# Patient Record
Sex: FEMALE | Race: WHITE | NOT HISPANIC OR LATINO | Employment: FULL TIME | URBAN - METROPOLITAN AREA
[De-identification: names, ages, dates, MRNs, and addresses within clinical notes are randomized per-mention and may not be internally consistent; named-entity substitution may affect disease eponyms.]

---

## 2023-02-25 LAB
ALANINE AMINOTRANSFERASE (SGPT) (U/L) IN SER/PLAS: 16 U/L (ref 7–45)
ALBUMIN (G/DL) IN SER/PLAS: 4.4 G/DL (ref 3.4–5)
ALKALINE PHOSPHATASE (U/L) IN SER/PLAS: 37 U/L (ref 33–110)
ANION GAP IN SER/PLAS: 12 MMOL/L (ref 10–20)
ASPARTATE AMINOTRANSFERASE (SGOT) (U/L) IN SER/PLAS: 19 U/L (ref 9–39)
BILIRUBIN TOTAL (MG/DL) IN SER/PLAS: 0.7 MG/DL (ref 0–1.2)
CALCIUM (MG/DL) IN SER/PLAS: 10.4 MG/DL (ref 8.6–10.3)
CARBON DIOXIDE, TOTAL (MMOL/L) IN SER/PLAS: 28 MMOL/L (ref 21–32)
CHLORIDE (MMOL/L) IN SER/PLAS: 105 MMOL/L (ref 98–107)
CHOLESTEROL (MG/DL) IN SER/PLAS: 256 MG/DL (ref 0–199)
CHOLESTEROL IN HDL (MG/DL) IN SER/PLAS: 60.9 MG/DL
CHOLESTEROL/HDL RATIO: 4.2
CREATININE (MG/DL) IN SER/PLAS: 0.88 MG/DL (ref 0.5–1.05)
ERYTHROCYTE DISTRIBUTION WIDTH (RATIO) BY AUTOMATED COUNT: 12.4 % (ref 11.5–14.5)
ERYTHROCYTE MEAN CORPUSCULAR HEMOGLOBIN CONCENTRATION (G/DL) BY AUTOMATED: 31.9 G/DL (ref 32–36)
ERYTHROCYTE MEAN CORPUSCULAR VOLUME (FL) BY AUTOMATED COUNT: 92 FL (ref 80–100)
ERYTHROCYTES (10*6/UL) IN BLOOD BY AUTOMATED COUNT: 4.38 X10E12/L (ref 4–5.2)
GFR FEMALE: 76 ML/MIN/1.73M2
GLUCOSE (MG/DL) IN SER/PLAS: 83 MG/DL (ref 74–99)
HEMATOCRIT (%) IN BLOOD BY AUTOMATED COUNT: 40.5 % (ref 36–46)
HEMOGLOBIN (G/DL) IN BLOOD: 12.9 G/DL (ref 12–16)
LDL: 174 MG/DL (ref 0–99)
LEUKOCYTES (10*3/UL) IN BLOOD BY AUTOMATED COUNT: 6.4 X10E9/L (ref 4.4–11.3)
PLATELETS (10*3/UL) IN BLOOD AUTOMATED COUNT: 288 X10E9/L (ref 150–450)
POTASSIUM (MMOL/L) IN SER/PLAS: 5.2 MMOL/L (ref 3.5–5.3)
PROTEIN TOTAL: 6.3 G/DL (ref 6.4–8.2)
SODIUM (MMOL/L) IN SER/PLAS: 140 MMOL/L (ref 136–145)
THYROTROPIN (MIU/L) IN SER/PLAS BY DETECTION LIMIT <= 0.05 MIU/L: 1.47 MIU/L (ref 0.44–3.98)
TRIGLYCERIDE (MG/DL) IN SER/PLAS: 106 MG/DL (ref 0–149)
UREA NITROGEN (MG/DL) IN SER/PLAS: 15 MG/DL (ref 6–23)
VLDL: 21 MG/DL (ref 0–40)

## 2023-08-09 ENCOUNTER — HOSPITAL ENCOUNTER (OUTPATIENT)
Dept: DATA CONVERSION | Facility: HOSPITAL | Age: 58
Discharge: HOME | End: 2023-08-09

## 2023-08-09 DIAGNOSIS — R19.7 DIARRHEA, UNSPECIFIED: ICD-10-CM

## 2023-08-09 DIAGNOSIS — K76.9 LIVER DISEASE, UNSPECIFIED: ICD-10-CM

## 2023-08-09 DIAGNOSIS — R10.9 UNSPECIFIED ABDOMINAL PAIN: ICD-10-CM

## 2023-08-09 DIAGNOSIS — K52.9 NONINFECTIVE GASTROENTERITIS AND COLITIS, UNSPECIFIED: ICD-10-CM

## 2023-08-09 LAB
ALBUMIN SERPL-MCNC: 4.5 GM/DL (ref 3.5–5)
ALBUMIN/GLOB SERPL: 1.6 RATIO (ref 1.5–3)
ALP BLD-CCNC: 50 U/L (ref 35–125)
ALT SERPL-CCNC: 22 U/L (ref 5–40)
ANION GAP SERPL CALCULATED.3IONS-SCNC: 13 MMOL/L (ref 0–19)
AST SERPL-CCNC: 38 U/L (ref 5–40)
BASOPHILS # BLD AUTO: 0.03 K/UL (ref 0–0.22)
BASOPHILS NFR BLD AUTO: 0.2 % (ref 0–1)
BILIRUB SERPL-MCNC: 0.5 MG/DL (ref 0.1–1.2)
BILIRUB UR QL STRIP.AUTO: NEGATIVE
BUN SERPL-MCNC: 20 MG/DL (ref 8–25)
BUN/CREAT SERPL: 25 RATIO (ref 8–21)
CALCIUM SERPL-MCNC: 10.6 MG/DL (ref 8.5–10.4)
CHLORIDE SERPL-SCNC: 102 MMOL/L (ref 97–107)
CLARITY UR: CLEAR
CO2 SERPL-SCNC: 21 MMOL/L (ref 24–31)
COLOR UR: YELLOW
CREAT SERPL-MCNC: 0.8 MG/DL (ref 0.4–1.6)
DEPRECATED RDW RBC AUTO: 39.5 FL (ref 37–54)
DIFFERENTIAL METHOD BLD: ABNORMAL
EOSINOPHIL # BLD AUTO: 0 K/UL (ref 0–0.45)
EOSINOPHIL NFR BLD: 0 % (ref 0–3)
ERYTHROCYTE [DISTWIDTH] IN BLOOD BY AUTOMATED COUNT: 12.1 % (ref 11.7–15)
GFR SERPL CREATININE-BSD FRML MDRD: 85 ML/MIN/1.73 M2
GLOBULIN SER-MCNC: 2.8 G/DL (ref 1.9–3.7)
GLUCOSE SERPL-MCNC: 136 MG/DL (ref 65–99)
GLUCOSE UR STRIP.AUTO-MCNC: NEGATIVE MG/DL
HCG UR QL: NEGATIVE
HCT VFR BLD AUTO: 42.3 % (ref 36–44)
HGB BLD-MCNC: 14.3 GM/DL (ref 12–15)
HGB UR QL STRIP.AUTO: ABNORMAL /HPF (ref 0–3)
HGB UR QL: NEGATIVE
IMM GRANULOCYTES # BLD AUTO: 0.06 K/UL (ref 0–0.1)
KETONES UR QL STRIP.AUTO: NEGATIVE
LEUKOCYTE ESTERASE UR QL STRIP.AUTO: NEGATIVE
LIPASE SERPL-CCNC: 53 U/L (ref 16–63)
LYMPHOCYTES # BLD AUTO: 0.62 K/UL (ref 1.2–3.2)
LYMPHOCYTES NFR BLD MANUAL: 3.7 % (ref 20–40)
MCH RBC QN AUTO: 30.1 PG (ref 26–34)
MCHC RBC AUTO-ENTMCNC: 33.8 % (ref 31–37)
MCV RBC AUTO: 89.1 FL (ref 80–100)
MICROSCOPIC (UA): ABNORMAL
MONOCYTES # BLD AUTO: 0.74 K/UL (ref 0–0.8)
MONOCYTES NFR BLD MANUAL: 4.4 % (ref 0–8)
NEUTROPHILS # BLD AUTO: 15.31 K/UL
NEUTROPHILS # BLD AUTO: 15.31 K/UL (ref 1.8–7.7)
NEUTROPHILS.IMMATURE NFR BLD: 0.4 % (ref 0–1)
NEUTS SEG NFR BLD: 91.3 % (ref 50–70)
NITRITE UR QL STRIP.AUTO: NEGATIVE
NRBC BLD-RTO: 0 /100 WBC
PH UR STRIP.AUTO: 5.5 [PH] (ref 4.6–8)
PLATELET # BLD AUTO: 257 K/UL (ref 150–450)
PLATELET BLD QL SMEAR: ABNORMAL
PMV BLD AUTO: 9.9 CU (ref 7–12.6)
POTASSIUM SERPL-SCNC: 3.8 MMOL/L (ref 3.4–5.1)
PROT SERPL-MCNC: 7.3 G/DL (ref 5.9–7.9)
PROT UR STRIP.AUTO-MCNC: ABNORMAL MG/DL
RBC # BLD AUTO: 4.75 M/UL (ref 4–4.9)
RBC MORPH BLD: ABNORMAL
SODIUM SERPL-SCNC: 136 MMOL/L (ref 133–145)
SP GR UR STRIP.AUTO: 1.03 (ref 1–1.03)
UNSPECIFIED CRY UR QL COMP ASSIST: ABNORMAL
URINE CULTURE: ABNORMAL
UROBILINOGEN UR QL STRIP.AUTO: NORMAL MG/DL (ref 0–1)
WBC # BLD AUTO: 16.8 K/UL (ref 4.5–11)
WBC #/AREA URNS AUTO: ABNORMAL /HPF (ref 0–3)
WBC MORPH BLD: ABNORMAL

## 2023-08-11 ENCOUNTER — TELEPHONE (OUTPATIENT)
Dept: PRIMARY CARE | Facility: CLINIC | Age: 58
End: 2023-08-11
Payer: COMMERCIAL

## 2023-08-11 NOTE — TELEPHONE ENCOUNTER
----- Message from Aurora Eagle sent at 8/10/2023  3:55 PM EDT -----  Patient requesting a call back from you regarding the pain you two previously spoke about. Than you.      Will discuss at upcoming visit

## 2023-08-17 ENCOUNTER — APPOINTMENT (OUTPATIENT)
Dept: PRIMARY CARE | Facility: CLINIC | Age: 58
End: 2023-08-17
Payer: COMMERCIAL

## 2023-11-20 PROBLEM — M79.605 PAIN OF LEFT LOWER EXTREMITY: Status: ACTIVE | Noted: 2023-11-20

## 2023-11-20 PROBLEM — K76.9 LIVER DISEASE: Status: ACTIVE | Noted: 2023-08-09

## 2023-11-20 PROBLEM — H83.2X1: Status: ACTIVE | Noted: 2023-11-20

## 2023-11-20 PROBLEM — R60.0 EDEMA OF LEFT LOWER EXTREMITY: Status: ACTIVE | Noted: 2023-11-20

## 2023-11-20 PROBLEM — H93.19 TINNITUS: Status: ACTIVE | Noted: 2023-11-20

## 2023-11-20 PROBLEM — M81.0 OSTEOPOROSIS: Status: ACTIVE | Noted: 2022-12-23

## 2023-11-20 PROBLEM — N95.1 MENOPAUSAL SYMPTOMS: Status: ACTIVE | Noted: 2023-11-20

## 2023-11-20 PROBLEM — R19.7 DIARRHEA: Status: ACTIVE | Noted: 2023-08-09

## 2023-11-20 PROBLEM — D21.9 MYOMA: Status: ACTIVE | Noted: 2023-11-20

## 2023-11-20 PROBLEM — Z87.891 PERSONAL HISTORY OF NICOTINE DEPENDENCE: Status: ACTIVE | Noted: 2022-08-08

## 2023-11-20 PROBLEM — E55.9 VITAMIN D DEFICIENCY: Status: ACTIVE | Noted: 2023-01-04

## 2023-11-20 PROBLEM — R59.9 ADENOPATHY: Status: ACTIVE | Noted: 2023-11-20

## 2023-11-20 PROBLEM — D64.9 ANEMIA: Status: ACTIVE | Noted: 2022-08-08

## 2023-11-20 PROBLEM — K63.5 POLYP OF COLON: Status: ACTIVE | Noted: 2022-08-08

## 2023-11-20 PROBLEM — Z85.3 HISTORY OF MALIGNANT NEOPLASM OF BREAST: Status: ACTIVE | Noted: 2023-01-04

## 2023-11-20 PROBLEM — Z86.2 HISTORY OF ANEMIA: Status: ACTIVE | Noted: 2023-11-20

## 2023-11-20 PROBLEM — R10.9 ABDOMINAL PAIN: Status: ACTIVE | Noted: 2023-08-09

## 2023-11-20 PROBLEM — C50.211 MALIGNANT NEOPLASM OF UPPER-INNER QUADRANT OF RIGHT FEMALE BREAST (MULTI): Status: ACTIVE | Noted: 2023-01-04

## 2023-11-20 PROBLEM — J31.0 CHRONIC RHINITIS: Status: ACTIVE | Noted: 2023-11-20

## 2023-11-20 PROBLEM — N63.10 BREAST MASS, RIGHT: Status: ACTIVE | Noted: 2023-11-20

## 2023-11-20 PROBLEM — R43.8 IMPAIRED SENSE OF SMELL: Status: ACTIVE | Noted: 2023-11-20

## 2023-11-20 PROBLEM — I61.9 INTRACEREBRAL HEMORRHAGE (MULTI): Status: ACTIVE | Noted: 2023-11-20

## 2023-11-20 PROBLEM — A09 INFECTIOUS COLITIS: Status: ACTIVE | Noted: 2023-11-20

## 2023-11-20 PROBLEM — E04.2 NONTOXIC MULTINODULAR GOITER: Status: ACTIVE | Noted: 2022-08-08

## 2023-11-20 PROBLEM — E83.52 HYPERCALCEMIA: Status: ACTIVE | Noted: 2023-11-20

## 2023-11-20 PROBLEM — E78.5 HYPERLIPIDEMIA: Status: ACTIVE | Noted: 2023-11-20

## 2023-11-20 RX ORDER — ALENDRONATE SODIUM 70 MG/1
70 TABLET ORAL
COMMUNITY
End: 2024-06-03 | Stop reason: SDUPTHER

## 2023-11-20 RX ORDER — IBUPROFEN 600 MG/1
600 TABLET ORAL EVERY 6 HOURS PRN
COMMUNITY
Start: 2023-08-09 | End: 2024-03-22 | Stop reason: ALTCHOICE

## 2023-11-20 RX ORDER — ANASTROZOLE 1 MG/1
1 TABLET ORAL DAILY
COMMUNITY
Start: 2022-12-28 | End: 2023-12-06 | Stop reason: SDUPTHER

## 2023-11-20 RX ORDER — CIPROFLOXACIN 500 MG/1
500 TABLET ORAL 2 TIMES DAILY
COMMUNITY
Start: 2023-08-09 | End: 2024-03-22 | Stop reason: ALTCHOICE

## 2023-12-01 ENCOUNTER — LAB (OUTPATIENT)
Dept: LAB | Facility: HOSPITAL | Age: 58
End: 2023-12-01
Payer: COMMERCIAL

## 2023-12-01 DIAGNOSIS — D64.9 ANEMIA: ICD-10-CM

## 2023-12-01 DIAGNOSIS — E55.9 VITAMIN D DEFICIENCY: Primary | ICD-10-CM

## 2023-12-01 LAB
25(OH)D3 SERPL-MCNC: 33 NG/ML (ref 30–100)
ALBUMIN SERPL BCP-MCNC: 4.7 G/DL (ref 3.4–5)
ALP SERPL-CCNC: 38 U/L (ref 33–110)
ALT SERPL W P-5'-P-CCNC: 23 U/L (ref 7–45)
ANION GAP SERPL CALC-SCNC: 12 MMOL/L (ref 10–20)
AST SERPL W P-5'-P-CCNC: 25 U/L (ref 9–39)
BASOPHILS # BLD AUTO: 0.05 X10*3/UL (ref 0–0.1)
BASOPHILS NFR BLD AUTO: 0.9 %
BILIRUB SERPL-MCNC: 0.6 MG/DL (ref 0–1.2)
BUN SERPL-MCNC: 9 MG/DL (ref 6–23)
CALCIUM SERPL-MCNC: 9.8 MG/DL (ref 8.6–10.3)
CHLORIDE SERPL-SCNC: 106 MMOL/L (ref 98–107)
CO2 SERPL-SCNC: 25 MMOL/L (ref 21–32)
CREAT SERPL-MCNC: 0.64 MG/DL (ref 0.5–1.05)
EOSINOPHIL # BLD AUTO: 0.11 X10*3/UL (ref 0–0.7)
EOSINOPHIL NFR BLD AUTO: 1.9 %
ERYTHROCYTE [DISTWIDTH] IN BLOOD BY AUTOMATED COUNT: 12.1 % (ref 11.5–14.5)
GFR SERPL CREATININE-BSD FRML MDRD: >90 ML/MIN/1.73M*2
GLUCOSE SERPL-MCNC: 87 MG/DL (ref 74–99)
HCT VFR BLD AUTO: 42 % (ref 36–46)
HGB BLD-MCNC: 13.3 G/DL (ref 12–16)
IMM GRANULOCYTES # BLD AUTO: 0.01 X10*3/UL (ref 0–0.7)
IMM GRANULOCYTES NFR BLD AUTO: 0.2 % (ref 0–0.9)
LYMPHOCYTES # BLD AUTO: 1.53 X10*3/UL (ref 1.2–4.8)
LYMPHOCYTES NFR BLD AUTO: 26.6 %
MCH RBC QN AUTO: 29.6 PG (ref 26–34)
MCHC RBC AUTO-ENTMCNC: 31.7 G/DL (ref 32–36)
MCV RBC AUTO: 94 FL (ref 80–100)
MONOCYTES # BLD AUTO: 0.66 X10*3/UL (ref 0.1–1)
MONOCYTES NFR BLD AUTO: 11.5 %
NEUTROPHILS # BLD AUTO: 3.39 X10*3/UL (ref 1.2–7.7)
NEUTROPHILS NFR BLD AUTO: 58.9 %
PLATELET # BLD AUTO: 234 X10*3/UL (ref 150–450)
POTASSIUM SERPL-SCNC: 4.5 MMOL/L (ref 3.5–5.3)
PROT SERPL-MCNC: 6.5 G/DL (ref 6.4–8.2)
RBC # BLD AUTO: 4.49 X10*6/UL (ref 4–5.2)
SODIUM SERPL-SCNC: 138 MMOL/L (ref 136–145)
WBC # BLD AUTO: 5.8 X10*3/UL (ref 4.4–11.3)

## 2023-12-01 PROCEDURE — 82306 VITAMIN D 25 HYDROXY: CPT

## 2023-12-01 PROCEDURE — 36415 COLL VENOUS BLD VENIPUNCTURE: CPT

## 2023-12-01 PROCEDURE — 85025 COMPLETE CBC W/AUTO DIFF WBC: CPT

## 2023-12-01 PROCEDURE — 80053 COMPREHEN METABOLIC PANEL: CPT

## 2023-12-06 ENCOUNTER — OFFICE VISIT (OUTPATIENT)
Dept: HEMATOLOGY/ONCOLOGY | Facility: CLINIC | Age: 58
End: 2023-12-06
Payer: COMMERCIAL

## 2023-12-06 ENCOUNTER — APPOINTMENT (OUTPATIENT)
Dept: LAB | Facility: HOSPITAL | Age: 58
End: 2023-12-06
Payer: COMMERCIAL

## 2023-12-06 VITALS
HEART RATE: 61 BPM | TEMPERATURE: 98.2 F | HEIGHT: 64 IN | SYSTOLIC BLOOD PRESSURE: 117 MMHG | BODY MASS INDEX: 24.24 KG/M2 | DIASTOLIC BLOOD PRESSURE: 77 MMHG | RESPIRATION RATE: 18 BRPM | WEIGHT: 141.98 LBS | OXYGEN SATURATION: 95 %

## 2023-12-06 DIAGNOSIS — Z17.0 MALIGNANT NEOPLASM OF UPPER-INNER QUADRANT OF RIGHT BREAST IN FEMALE, ESTROGEN RECEPTOR POSITIVE (MULTI): Primary | ICD-10-CM

## 2023-12-06 DIAGNOSIS — C50.211 MALIGNANT NEOPLASM OF UPPER-INNER QUADRANT OF RIGHT BREAST IN FEMALE, ESTROGEN RECEPTOR POSITIVE (MULTI): Primary | ICD-10-CM

## 2023-12-06 PROCEDURE — 99214 OFFICE O/P EST MOD 30 MIN: CPT | Performed by: NURSE PRACTITIONER

## 2023-12-06 RX ORDER — ANASTROZOLE 1 MG/1
1 TABLET ORAL DAILY
Qty: 90 TABLET | Refills: 3 | Status: SHIPPED | OUTPATIENT
Start: 2023-12-06 | End: 2024-06-10 | Stop reason: SDUPTHER

## 2023-12-06 ASSESSMENT — COLUMBIA-SUICIDE SEVERITY RATING SCALE - C-SSRS
2. HAVE YOU ACTUALLY HAD ANY THOUGHTS OF KILLING YOURSELF?: NO
6. HAVE YOU EVER DONE ANYTHING, STARTED TO DO ANYTHING, OR PREPARED TO DO ANYTHING TO END YOUR LIFE?: NO
1. IN THE PAST MONTH, HAVE YOU WISHED YOU WERE DEAD OR WISHED YOU COULD GO TO SLEEP AND NOT WAKE UP?: NO

## 2023-12-06 ASSESSMENT — ENCOUNTER SYMPTOMS
DEPRESSION: 0
OCCASIONAL FEELINGS OF UNSTEADINESS: 0
LOSS OF SENSATION IN FEET: 0

## 2023-12-06 ASSESSMENT — PAIN SCALES - GENERAL: PAINLEVEL: 0-NO PAIN

## 2023-12-06 ASSESSMENT — PATIENT HEALTH QUESTIONNAIRE - PHQ9
2. FEELING DOWN, DEPRESSED OR HOPELESS: NOT AT ALL
1. LITTLE INTEREST OR PLEASURE IN DOING THINGS: NOT AT ALL
SUM OF ALL RESPONSES TO PHQ9 QUESTIONS 1 AND 2: 0

## 2023-12-06 NOTE — PROGRESS NOTES
"Patient ID: Paulette Mercer is a 58 y.o. female.    Primary Care Provider: Maricel Wagner, LINDA-CNP  Visit Type: follow up      Subjective    HPI    Cancer History:    Breast   AJCC Edition: 7th (AJCC), Diagnosis Date: 02-Dec-2015, IIA, T2 N0 M0    Breast   AJCC Edition: 7th (AJCC), Diagnosis Date: 04-May-2016, IIA, T1b pN1a M0       Chief Complaint: follow up   Interval History:    58-year-old  female presented Dec 2015 with right breast invasive ductal carcinoma, 2.5 cm, ER/[place] positive, HER-2 positive, as well as DCIS,   neoadjuvant chemotherapy with TCHP from 12/21/15 to 4/2016,   lumpectomy on 5/4/16 of 0.7cm with 1/7LN  (ypT1b ypN1a),   completed adjuvant XRT on 7/27/16.   started on tamoxifen since 9/1/16. She was switched to anastrozole since 1/1/19.  She was also continued on every 3 weeks Herceptin for total a year after surgery. She completed in 12/2016. LMP 1/2016.   DEXA scan in 12/2020 showed osteoporosis and she was started on Fosamax.  Repeat DEXA 12/23/22 still showed osteoporosis, but was improved.      Paulette presents to office today for routine evaluation and follow up.  Mammogram 1/4/23 benign.   She is otherwise tolerating it well with minimal hot flashes and no bone or joint pain.      Review of Systems - Oncology Normal    Objective   BSA: 1.7 meters squared  /77 (BP Location: Left arm, Patient Position: Sitting, BP Cuff Size: Large adult)   Pulse 61   Temp 36.8 °C (98.2 °F) (Temporal)   Resp 18   Ht (S) 1.623 m (5' 3.9\")   Wt 64.4 kg (141 lb 15.6 oz)   SpO2 95%   BMI 24.45 kg/m²      has a past medical history of Personal history of diseases of the blood and blood-forming organs and certain disorders involving the immune mechanism, Personal history of malignant neoplasm of breast, and Personal history of other diseases of the nervous system and sense organs.   has a past surgical history that includes Breast lumpectomy (10/17/2016) and Breast lumpectomy " (06/24/2016).        Paulette Mercer  has no history on file for tobacco use.  She  has no history on file for alcohol use.  She  has no history on file for drug use.    Physical Exam  Normal exam  Bilateral breast exam shows right breast smaller with nipple inversion secondary to surgery.  No palpable masses or nodularity.  No suspcious findings    WBC   Date/Time Value Ref Range Status   12/01/2023 08:28 AM 5.8 4.4 - 11.3 x10*3/uL Final   08/09/2023 09:32 AM 16.8 (H) 4.5 - 11.0 K/UL Final   06/02/2023 08:15 AM 5.9 4.4 - 11.3 x10E9/L Final   02/25/2023 09:28 AM 6.4 4.4 - 11.3 x10E9/L Final     nRBC   Date Value Ref Range Status   08/09/2023 0 0 /100 WBC Final   02/26/2020 0.0 0.0 - 0.0 /100 WBC Final     RBC   Date Value Ref Range Status   12/01/2023 4.49 4.00 - 5.20 x10*6/uL Final   08/09/2023 4.75 4.0 - 4.9 M/UL Final   06/02/2023 4.38 4.00 - 5.20 x10E12/L Final   02/25/2023 4.38 4.00 - 5.20 x10E12/L Final     Hemoglobin   Date Value Ref Range Status   12/01/2023 13.3 12.0 - 16.0 g/dL Final   08/09/2023 14.3 12.0 - 15.0 GM/DL Final   06/02/2023 13.1 12.0 - 16.0 g/dL Final   02/25/2023 12.9 12.0 - 16.0 g/dL Final     Hematocrit   Date Value Ref Range Status   12/01/2023 42.0 36.0 - 46.0 % Final   08/09/2023 42.3 36 - 44 % Final   06/02/2023 41.2 36.0 - 46.0 % Final   02/25/2023 40.5 36.0 - 46.0 % Final     MCV   Date/Time Value Ref Range Status   12/01/2023 08:28 AM 94 80 - 100 fL Final   08/09/2023 09:32 AM 89.1 80 - 100 FL Final   06/02/2023 08:15 AM 94 80 - 100 fL Final   02/25/2023 09:28 AM 92 80 - 100 fL Final     MCH   Date/Time Value Ref Range Status   12/01/2023 08:28 AM 29.6 26.0 - 34.0 pg Final   08/09/2023 09:32 AM 30.1 26 - 34 PG Final     MCHC   Date/Time Value Ref Range Status   12/01/2023 08:28 AM 31.7 (L) 32.0 - 36.0 g/dL Final   08/09/2023 09:32 AM 33.8 31 - 37 % Final   06/02/2023 08:15 AM 31.8 (L) 32.0 - 36.0 g/dL Final   02/25/2023 09:28 AM 31.9 (L) 32.0 - 36.0 g/dL Final     RDW   Date/Time  Value Ref Range Status   12/01/2023 08:28 AM 12.1 11.5 - 14.5 % Final   06/02/2023 08:15 AM 11.7 11.5 - 14.5 % Final   02/25/2023 09:28 AM 12.4 11.5 - 14.5 % Final   11/23/2022 08:18 AM 11.7 11.5 - 14.5 % Final     Platelets   Date/Time Value Ref Range Status   12/01/2023 08:28  150 - 450 x10*3/uL Final   08/09/2023 09:32  150 - 450 K/UL Final   06/02/2023 08:15  150 - 450 x10E9/L Final   02/25/2023 09:28  150 - 450 x10E9/L Final     MPV   Date/Time Value Ref Range Status   08/09/2023 09:32 AM 9.9 7.0 - 12.6 CU Final     Neutrophils %   Date/Time Value Ref Range Status   12/01/2023 08:28 AM 58.9 40.0 - 80.0 % Final   06/02/2023 08:15 AM 62.9 40.0 - 80.0 % Final   11/23/2022 08:18 AM 61.3 40.0 - 80.0 % Final   02/25/2022 03:12 PM 61.6 40.0 - 80.0 % Final     Immature Granulocytes %, Automated   Date/Time Value Ref Range Status   12/01/2023 08:28 AM 0.2 0.0 - 0.9 % Final     Comment:     Immature Granulocyte Count (IG) includes promyelocytes, myelocytes and metamyelocytes but does not include bands. Percent differential counts (%) should be interpreted in the context of the absolute cell counts (cells/UL).   06/02/2023 08:15 AM 0.0 0.0 - 0.9 % Final     Comment:      Immature Granulocyte Count (IG) includes promyelocytes,    myelocytes and metamyelocytes but does not include bands.   Percent differential counts (%) should be interpreted in the   context of the absolute cell counts (cells/L).     11/23/2022 08:18 AM 0.2 0.0 - 0.9 % Final     Comment:      Immature Granulocyte Count (IG) includes promyelocytes,    myelocytes and metamyelocytes but does not include bands.   Percent differential counts (%) should be interpreted in the   context of the absolute cell counts (cells/L).     02/25/2022 03:12 PM 0.1 0.0 - 0.9 % Final     Comment:      Immature Granulocyte Count (IG) includes promyelocytes,    myelocytes and metamyelocytes but does not include bands.   Percent differential counts (%)  should be interpreted in the   context of the absolute cell counts (cells/L).       Lymphocytes %   Date/Time Value Ref Range Status   12/01/2023 08:28 AM 26.6 13.0 - 44.0 % Final   08/09/2023 09:32 AM 3.70 (L) 20 - 40 % Final   06/02/2023 08:15 AM 24.5 13.0 - 44.0 % Final     Comment:      Percent differential counts (%) should be interpreted in the   context of the absolute cell counts (cells/L).     11/23/2022 08:18 AM 25.8 13.0 - 44.0 % Final     Comment:      Percent differential counts (%) should be interpreted in the   context of the absolute cell counts (cells/L).     02/25/2022 03:12 PM 27.8 13.0 - 44.0 % Final     Monocytes %   Date/Time Value Ref Range Status   12/01/2023 08:28 AM 11.5 2.0 - 10.0 % Final   08/09/2023 09:32 AM 4.40 0 - 8 % Final   06/02/2023 08:15 AM 10.6 2.0 - 10.0 % Final   11/23/2022 08:18 AM 10.7 2.0 - 10.0 % Final   02/25/2022 03:12 PM 9.2 2.0 - 10.0 % Final     Eosinophils %   Date/Time Value Ref Range Status   12/01/2023 08:28 AM 1.9 0.0 - 6.0 % Final   06/02/2023 08:15 AM 1.0 0.0 - 6.0 % Final   11/23/2022 08:18 AM 1.4 0.0 - 6.0 % Final   02/25/2022 03:12 PM 0.5 0.0 - 6.0 % Final     Basophils %   Date/Time Value Ref Range Status   12/01/2023 08:28 AM 0.9 0.0 - 2.0 % Final   08/09/2023 09:32 AM 0.20 0 - 1 % Final   06/02/2023 08:15 AM 1.0 0.0 - 2.0 % Final   11/23/2022 08:18 AM 0.6 0.0 - 2.0 % Final     Neutrophils Absolute   Date/Time Value Ref Range Status   12/01/2023 08:28 AM 3.39 1.20 - 7.70 x10*3/uL Final     Comment:     Percent differential counts (%) should be interpreted in the context of the absolute cell counts (cells/uL).   08/09/2023 09:32 AM 15.31 (H) 1.8 - 7.7 K/UL Final   06/02/2023 08:15 AM 3.71 1.20 - 7.70 x10E9/L Final   11/23/2022 08:18 AM 3.86 1.20 - 7.70 x10E9/L Final     Immature Granulocytes Absolute, Automated   Date/Time Value Ref Range Status   12/01/2023 08:28 AM 0.01 0.00 - 0.70 x10*3/uL Final   08/09/2023 09:32 AM 0.06 0.0 - 0.1 K/UL Final      Lymphocytes Absolute   Date/Time Value Ref Range Status   12/01/2023 08:28 AM 1.53 1.20 - 4.80 x10*3/uL Final   08/09/2023 09:32 AM 0.62 (L) 1.2 - 3.2 K/UL Final   06/02/2023 08:15 AM 1.45 1.20 - 4.80 x10E9/L Final   11/23/2022 08:18 AM 1.63 1.20 - 4.80 x10E9/L Final     Monocytes Absolute   Date/Time Value Ref Range Status   12/01/2023 08:28 AM 0.66 0.10 - 1.00 x10*3/uL Final   08/09/2023 09:32 AM 0.74 0 - 0.8 K/UL Final   06/02/2023 08:15 AM 0.63 0.10 - 1.00 x10E9/L Final   11/23/2022 08:18 AM 0.67 0.10 - 1.00 x10E9/L Final     Eosinophils Absolute   Date/Time Value Ref Range Status   12/01/2023 08:28 AM 0.11 0.00 - 0.70 x10*3/uL Final   08/09/2023 09:32 AM 0.00 0 - 0.45 K/UL Final   06/02/2023 08:15 AM 0.06 0.00 - 0.70 x10E9/L Final   11/23/2022 08:18 AM 0.09 0.00 - 0.70 x10E9/L Final     Basophils Absolute   Date/Time Value Ref Range Status   12/01/2023 08:28 AM 0.05 0.00 - 0.10 x10*3/uL Final   08/09/2023 09:32 AM 0.03 0.00 - 0.22 K/UL Final   06/02/2023 08:15 AM 0.06 0.00 - 0.10 x10E9/L Final   11/23/2022 08:18 AM 0.04 0.00 - 0.10 x10E9/L Final         Assessment/Plan    Right breast invasive ductal carcinoma 2.5cm, ER/UT+, Her2 +  s/p neoadjuvant TCHPcompleted 4/20/16 with lumpectomy 5/416 that showed 0.7cm tumor with 1/7 LN positive.  She completed radiation therapy 7/27/16, started Tamoxifen 9/1/16 and completed one year of HerceptinDec 2016.    LMP 2015, switched to Arimidex 1/1/19.  Will Rx #90 today    Osteoporosis treated with Fosamax, with benefit shown on DEXA 12/23/22 and will continue Fosamax #90 RX.  Discussed DEXA in detail and comparison and benefit on Fosamax    FH sister with breast cancer 51yo, father with melenoma 66 yo    Plan:    Bilateral mammogram due Jan 2024  DEXA due 12/23/24  Continue arimidex and fosamax.  Discuss with dentist  Discussed benefit of 10 year plan as she was premenopausal at time of diagnosis.          Plan:    OV 6 months with labs                Vnae Garcia,  LORRAINE

## 2024-01-25 DIAGNOSIS — C50.211 MALIGNANT NEOPLASM OF UPPER-INNER QUADRANT OF RIGHT BREAST IN FEMALE, ESTROGEN RECEPTOR POSITIVE (MULTI): Primary | ICD-10-CM

## 2024-01-25 DIAGNOSIS — Z17.0 MALIGNANT NEOPLASM OF UPPER-INNER QUADRANT OF RIGHT BREAST IN FEMALE, ESTROGEN RECEPTOR POSITIVE (MULTI): Primary | ICD-10-CM

## 2024-01-26 ENCOUNTER — HOSPITAL ENCOUNTER (OUTPATIENT)
Dept: RADIOLOGY | Facility: HOSPITAL | Age: 59
Discharge: HOME | End: 2024-01-26
Payer: COMMERCIAL

## 2024-01-26 DIAGNOSIS — Z17.0 MALIGNANT NEOPLASM OF UPPER-INNER QUADRANT OF RIGHT BREAST IN FEMALE, ESTROGEN RECEPTOR POSITIVE (MULTI): ICD-10-CM

## 2024-01-26 DIAGNOSIS — C50.211 MALIGNANT NEOPLASM OF UPPER-INNER QUADRANT OF RIGHT BREAST IN FEMALE, ESTROGEN RECEPTOR POSITIVE (MULTI): ICD-10-CM

## 2024-01-26 PROCEDURE — 77066 DX MAMMO INCL CAD BI: CPT | Performed by: RADIOLOGY

## 2024-01-26 PROCEDURE — 77062 BREAST TOMOSYNTHESIS BI: CPT | Performed by: RADIOLOGY

## 2024-01-26 PROCEDURE — 77062 BREAST TOMOSYNTHESIS BI: CPT

## 2024-01-31 ENCOUNTER — DOCUMENTATION (OUTPATIENT)
Dept: HEMATOLOGY/ONCOLOGY | Facility: CLINIC | Age: 59
End: 2024-01-31
Payer: COMMERCIAL

## 2024-01-31 NOTE — PROGRESS NOTES
Spoke with patient and mammogram done 1/26/24 JOEL, benign and routine follow up   Vane Garcia PA-C

## 2024-02-26 DIAGNOSIS — Z00.00 HEALTHCARE MAINTENANCE: ICD-10-CM

## 2024-02-26 DIAGNOSIS — D64.9 ANEMIA, UNSPECIFIED TYPE: ICD-10-CM

## 2024-02-26 DIAGNOSIS — E04.2 NONTOXIC MULTINODULAR GOITER: ICD-10-CM

## 2024-02-26 DIAGNOSIS — E78.5 HYPERLIPIDEMIA, UNSPECIFIED HYPERLIPIDEMIA TYPE: ICD-10-CM

## 2024-02-26 DIAGNOSIS — E83.52 HYPERCALCEMIA: ICD-10-CM

## 2024-03-16 ENCOUNTER — LAB (OUTPATIENT)
Dept: LAB | Facility: LAB | Age: 59
End: 2024-03-16
Payer: COMMERCIAL

## 2024-03-16 DIAGNOSIS — E83.52 HYPERCALCEMIA: ICD-10-CM

## 2024-03-16 DIAGNOSIS — Z00.00 HEALTHCARE MAINTENANCE: ICD-10-CM

## 2024-03-16 DIAGNOSIS — D64.9 ANEMIA, UNSPECIFIED TYPE: ICD-10-CM

## 2024-03-16 DIAGNOSIS — E78.5 HYPERLIPIDEMIA, UNSPECIFIED HYPERLIPIDEMIA TYPE: ICD-10-CM

## 2024-03-16 DIAGNOSIS — E04.2 NONTOXIC MULTINODULAR GOITER: ICD-10-CM

## 2024-03-16 LAB
ALBUMIN SERPL BCP-MCNC: 4.2 G/DL (ref 3.4–5)
ALP SERPL-CCNC: 43 U/L (ref 33–110)
ALT SERPL W P-5'-P-CCNC: 15 U/L (ref 7–45)
ANION GAP SERPL CALC-SCNC: 11 MMOL/L (ref 10–20)
AST SERPL W P-5'-P-CCNC: 18 U/L (ref 9–39)
BASOPHILS # BLD AUTO: 0.07 X10*3/UL (ref 0–0.1)
BASOPHILS NFR BLD AUTO: 1.1 %
BILIRUB SERPL-MCNC: 0.4 MG/DL (ref 0–1.2)
BUN SERPL-MCNC: 9 MG/DL (ref 6–23)
CALCIUM SERPL-MCNC: 9.5 MG/DL (ref 8.6–10.3)
CHLORIDE SERPL-SCNC: 106 MMOL/L (ref 98–107)
CHOLEST SERPL-MCNC: 243 MG/DL (ref 0–199)
CHOLESTEROL/HDL RATIO: 3.8
CO2 SERPL-SCNC: 28 MMOL/L (ref 21–32)
CREAT SERPL-MCNC: 0.73 MG/DL (ref 0.5–1.05)
EGFRCR SERPLBLD CKD-EPI 2021: >90 ML/MIN/1.73M*2
EOSINOPHIL # BLD AUTO: 0.13 X10*3/UL (ref 0–0.7)
EOSINOPHIL NFR BLD AUTO: 2.1 %
ERYTHROCYTE [DISTWIDTH] IN BLOOD BY AUTOMATED COUNT: 12.5 % (ref 11.5–14.5)
EST. AVERAGE GLUCOSE BLD GHB EST-MCNC: 111 MG/DL
GLUCOSE SERPL-MCNC: 85 MG/DL (ref 74–99)
HBA1C MFR BLD: 5.5 %
HCT VFR BLD AUTO: 41.2 % (ref 36–46)
HDLC SERPL-MCNC: 64.6 MG/DL
HGB BLD-MCNC: 12.9 G/DL (ref 12–16)
IMM GRANULOCYTES # BLD AUTO: 0.01 X10*3/UL (ref 0–0.7)
IMM GRANULOCYTES NFR BLD AUTO: 0.2 % (ref 0–0.9)
LDLC SERPL CALC-MCNC: 150 MG/DL
LYMPHOCYTES # BLD AUTO: 1.55 X10*3/UL (ref 1.2–4.8)
LYMPHOCYTES NFR BLD AUTO: 25.1 %
MCH RBC QN AUTO: 29.5 PG (ref 26–34)
MCHC RBC AUTO-ENTMCNC: 31.3 G/DL (ref 32–36)
MCV RBC AUTO: 94 FL (ref 80–100)
MONOCYTES # BLD AUTO: 0.66 X10*3/UL (ref 0.1–1)
MONOCYTES NFR BLD AUTO: 10.7 %
NEUTROPHILS # BLD AUTO: 3.76 X10*3/UL (ref 1.2–7.7)
NEUTROPHILS NFR BLD AUTO: 60.8 %
NON HDL CHOLESTEROL: 178 MG/DL (ref 0–149)
NRBC BLD-RTO: 0 /100 WBCS (ref 0–0)
PLATELET # BLD AUTO: 321 X10*3/UL (ref 150–450)
POTASSIUM SERPL-SCNC: 4.7 MMOL/L (ref 3.5–5.3)
PROT SERPL-MCNC: 6.4 G/DL (ref 6.4–8.2)
RBC # BLD AUTO: 4.38 X10*6/UL (ref 4–5.2)
SODIUM SERPL-SCNC: 140 MMOL/L (ref 136–145)
TRIGL SERPL-MCNC: 143 MG/DL (ref 0–149)
TSH SERPL-ACNC: 1.83 MIU/L (ref 0.44–3.98)
VLDL: 29 MG/DL (ref 0–40)
WBC # BLD AUTO: 6.2 X10*3/UL (ref 4.4–11.3)

## 2024-03-16 PROCEDURE — 80061 LIPID PANEL: CPT

## 2024-03-16 PROCEDURE — 36415 COLL VENOUS BLD VENIPUNCTURE: CPT

## 2024-03-16 PROCEDURE — 83036 HEMOGLOBIN GLYCOSYLATED A1C: CPT

## 2024-03-16 PROCEDURE — 84443 ASSAY THYROID STIM HORMONE: CPT

## 2024-03-16 PROCEDURE — 80053 COMPREHEN METABOLIC PANEL: CPT

## 2024-03-16 PROCEDURE — 85025 COMPLETE CBC W/AUTO DIFF WBC: CPT

## 2024-03-22 ENCOUNTER — OFFICE VISIT (OUTPATIENT)
Dept: PRIMARY CARE | Facility: CLINIC | Age: 59
End: 2024-03-22
Payer: COMMERCIAL

## 2024-03-22 ENCOUNTER — APPOINTMENT (OUTPATIENT)
Dept: PRIMARY CARE | Facility: CLINIC | Age: 59
End: 2024-03-22
Payer: COMMERCIAL

## 2024-03-22 VITALS
DIASTOLIC BLOOD PRESSURE: 74 MMHG | HEIGHT: 64 IN | BODY MASS INDEX: 24.41 KG/M2 | HEART RATE: 62 BPM | WEIGHT: 143 LBS | SYSTOLIC BLOOD PRESSURE: 116 MMHG | OXYGEN SATURATION: 98 %

## 2024-03-22 DIAGNOSIS — M81.0 OSTEOPOROSIS, UNSPECIFIED OSTEOPOROSIS TYPE, UNSPECIFIED PATHOLOGICAL FRACTURE PRESENCE: ICD-10-CM

## 2024-03-22 DIAGNOSIS — C50.211 MALIGNANT NEOPLASM OF UPPER-INNER QUADRANT OF RIGHT BREAST IN FEMALE, ESTROGEN RECEPTOR POSITIVE (MULTI): ICD-10-CM

## 2024-03-22 DIAGNOSIS — E78.5 HYPERLIPIDEMIA, UNSPECIFIED HYPERLIPIDEMIA TYPE: ICD-10-CM

## 2024-03-22 DIAGNOSIS — Z17.0 MALIGNANT NEOPLASM OF UPPER-INNER QUADRANT OF RIGHT BREAST IN FEMALE, ESTROGEN RECEPTOR POSITIVE (MULTI): ICD-10-CM

## 2024-03-22 DIAGNOSIS — Z00.00 HEALTH CARE MAINTENANCE: Primary | ICD-10-CM

## 2024-03-22 DIAGNOSIS — F19.21 HISTORY OF SUBSTANCE DEPENDENCE (MULTI): ICD-10-CM

## 2024-03-22 PROCEDURE — 1036F TOBACCO NON-USER: CPT | Performed by: NURSE PRACTITIONER

## 2024-03-22 PROCEDURE — 99396 PREV VISIT EST AGE 40-64: CPT | Performed by: NURSE PRACTITIONER

## 2024-03-22 ASSESSMENT — ENCOUNTER SYMPTOMS
HEADACHES: 0
FATIGUE: 0
DIARRHEA: 0
PSYCHIATRIC NEGATIVE: 1
COUGH: 0
CONSTIPATION: 0
NUMBNESS: 0
ABDOMINAL PAIN: 0
NAUSEA: 0
DIZZINESS: 0
CHILLS: 0
SHORTNESS OF BREATH: 0
FEVER: 0
SORE THROAT: 0
VOMITING: 0
PALPITATIONS: 0
MUSCULOSKELETAL NEGATIVE: 1
WEAKNESS: 0

## 2024-03-22 ASSESSMENT — PATIENT HEALTH QUESTIONNAIRE - PHQ9
2. FEELING DOWN, DEPRESSED OR HOPELESS: NOT AT ALL
SUM OF ALL RESPONSES TO PHQ9 QUESTIONS 1 AND 2: 0
1. LITTLE INTEREST OR PLEASURE IN DOING THINGS: NOT AT ALL

## 2024-03-22 NOTE — PROGRESS NOTES
"Subjective   Patient ID: Pualette Mercer is a 58 y.o. female who presents for follow up.    HPI   Overall feeling very good.  Reviewed labs.  Diet changes have improved overall cholesterol.  Denies chest pain, SOB, palpitations, dizziness,  or GI issues.      Review of Systems   Constitutional:  Negative for chills, fatigue and fever.   HENT:  Negative for congestion, ear pain and sore throat.    Eyes:  Negative for visual disturbance.   Respiratory:  Negative for cough and shortness of breath.    Cardiovascular:  Negative for chest pain, palpitations and leg swelling.   Gastrointestinal:  Negative for abdominal pain, constipation, diarrhea, nausea and vomiting.   Genitourinary: Negative.    Musculoskeletal: Negative.    Skin:  Negative for rash.   Neurological:  Negative for dizziness, weakness, numbness and headaches.   Psychiatric/Behavioral: Negative.         Objective   /74   Pulse 62   Ht 1.626 m (5' 4\")   Wt 64.9 kg (143 lb)   SpO2 98%   BMI 24.55 kg/m²     Physical Exam  Constitutional:       General: She is not in acute distress.     Appearance: Normal appearance.   HENT:      Head: Normocephalic and atraumatic.      Right Ear: Tympanic membrane, ear canal and external ear normal.      Left Ear: Tympanic membrane, ear canal and external ear normal.      Nose: Nose normal.      Mouth/Throat:      Mouth: Mucous membranes are moist.      Pharynx: Oropharynx is clear.   Eyes:      Extraocular Movements: Extraocular movements intact.      Conjunctiva/sclera: Conjunctivae normal.      Pupils: Pupils are equal, round, and reactive to light.   Cardiovascular:      Rate and Rhythm: Normal rate and regular rhythm.      Pulses: Normal pulses.      Heart sounds: Normal heart sounds. No murmur heard.  Pulmonary:      Effort: Pulmonary effort is normal.      Breath sounds: Normal breath sounds. No wheezing, rhonchi or rales.   Abdominal:      General: Bowel sounds are normal.      Palpations: Abdomen is " soft.      Tenderness: There is no abdominal tenderness.   Musculoskeletal:         General: Normal range of motion.      Cervical back: Normal range of motion and neck supple.   Lymphadenopathy:      Comments: No lymphadenopathy noted   Skin:     General: Skin is warm and dry.      Findings: No rash.   Neurological:      General: No focal deficit present.      Mental Status: She is alert and oriented to person, place, and time.      Cranial Nerves: No cranial nerve deficit.      Coordination: Coordination normal.      Gait: Gait normal.   Psychiatric:         Mood and Affect: Mood normal.         Behavior: Behavior normal.         Assessment/Plan   Problem List Items Addressed This Visit             ICD-10-CM    Hyperlipidemia - Primary E78.5     Continue healthy diet.         Osteoporosis M81.0     Continue Fosamax          Mammogram: mammogram due 1/2025  Colonoscopy: UTD, due 2027  Follow up in 1 year or sooner if needed

## 2024-05-28 ENCOUNTER — LAB (OUTPATIENT)
Dept: LAB | Facility: HOSPITAL | Age: 59
End: 2024-05-28
Payer: COMMERCIAL

## 2024-05-28 DIAGNOSIS — Z17.0 MALIGNANT NEOPLASM OF UPPER-INNER QUADRANT OF RIGHT BREAST IN FEMALE, ESTROGEN RECEPTOR POSITIVE (MULTI): ICD-10-CM

## 2024-05-28 DIAGNOSIS — C50.211 MALIGNANT NEOPLASM OF UPPER-INNER QUADRANT OF RIGHT BREAST IN FEMALE, ESTROGEN RECEPTOR POSITIVE (MULTI): ICD-10-CM

## 2024-05-28 LAB
ALBUMIN SERPL BCP-MCNC: 4.2 G/DL (ref 3.4–5)
ALP SERPL-CCNC: 36 U/L (ref 33–136)
ALT SERPL W P-5'-P-CCNC: 19 U/L (ref 7–45)
ANION GAP SERPL CALC-SCNC: 11 MMOL/L (ref 10–20)
AST SERPL W P-5'-P-CCNC: 22 U/L (ref 9–39)
BASOPHILS # BLD AUTO: 0.05 X10*3/UL (ref 0–0.1)
BASOPHILS NFR BLD AUTO: 0.9 %
BILIRUB SERPL-MCNC: 0.4 MG/DL (ref 0–1.2)
BUN SERPL-MCNC: 8 MG/DL (ref 6–23)
CALCIUM SERPL-MCNC: 9.3 MG/DL (ref 8.6–10.3)
CANCER AG27-29 SERPL-ACNC: 16.6 U/ML (ref 0–38.6)
CHLORIDE SERPL-SCNC: 108 MMOL/L (ref 98–107)
CO2 SERPL-SCNC: 23 MMOL/L (ref 21–32)
CREAT SERPL-MCNC: 0.79 MG/DL (ref 0.5–1.05)
EGFRCR SERPLBLD CKD-EPI 2021: 86 ML/MIN/1.73M*2
EOSINOPHIL # BLD AUTO: 0.15 X10*3/UL (ref 0–0.7)
EOSINOPHIL NFR BLD AUTO: 2.6 %
ERYTHROCYTE [DISTWIDTH] IN BLOOD BY AUTOMATED COUNT: 11.9 % (ref 11.5–14.5)
GLUCOSE SERPL-MCNC: 92 MG/DL (ref 74–99)
HCT VFR BLD AUTO: 41.4 % (ref 36–46)
HGB BLD-MCNC: 12.7 G/DL (ref 12–16)
IMM GRANULOCYTES # BLD AUTO: 0 X10*3/UL (ref 0–0.7)
IMM GRANULOCYTES NFR BLD AUTO: 0 % (ref 0–0.9)
LYMPHOCYTES # BLD AUTO: 1.77 X10*3/UL (ref 1.2–4.8)
LYMPHOCYTES NFR BLD AUTO: 30.4 %
MCH RBC QN AUTO: 29.7 PG (ref 26–34)
MCHC RBC AUTO-ENTMCNC: 30.7 G/DL (ref 32–36)
MCV RBC AUTO: 97 FL (ref 80–100)
MONOCYTES # BLD AUTO: 0.73 X10*3/UL (ref 0.1–1)
MONOCYTES NFR BLD AUTO: 12.5 %
NEUTROPHILS # BLD AUTO: 3.13 X10*3/UL (ref 1.2–7.7)
NEUTROPHILS NFR BLD AUTO: 53.6 %
PLATELET # BLD AUTO: 262 X10*3/UL (ref 150–450)
POTASSIUM SERPL-SCNC: 4.4 MMOL/L (ref 3.5–5.3)
PROT SERPL-MCNC: 6 G/DL (ref 6.4–8.2)
RBC # BLD AUTO: 4.28 X10*6/UL (ref 4–5.2)
SODIUM SERPL-SCNC: 138 MMOL/L (ref 136–145)
WBC # BLD AUTO: 5.8 X10*3/UL (ref 4.4–11.3)

## 2024-05-28 PROCEDURE — 85025 COMPLETE CBC W/AUTO DIFF WBC: CPT

## 2024-05-28 PROCEDURE — 80053 COMPREHEN METABOLIC PANEL: CPT

## 2024-05-28 PROCEDURE — 86300 IMMUNOASSAY TUMOR CA 15-3: CPT | Mod: GEALAB

## 2024-05-28 PROCEDURE — 36415 COLL VENOUS BLD VENIPUNCTURE: CPT

## 2024-05-31 ENCOUNTER — TELEPHONE (OUTPATIENT)
Dept: HEMATOLOGY/ONCOLOGY | Facility: CLINIC | Age: 59
End: 2024-05-31
Payer: COMMERCIAL

## 2024-05-31 DIAGNOSIS — M81.6 LOCALIZED OSTEOPOROSIS WITHOUT CURRENT PATHOLOGICAL FRACTURE: Primary | ICD-10-CM

## 2024-06-03 DIAGNOSIS — M81.6 LOCALIZED OSTEOPOROSIS WITHOUT CURRENT PATHOLOGICAL FRACTURE: Primary | ICD-10-CM

## 2024-06-03 RX ORDER — ALENDRONATE SODIUM 70 MG/1
70 TABLET ORAL
Qty: 12 TABLET | Refills: 3 | Status: SHIPPED | OUTPATIENT
Start: 2024-06-03 | End: 2024-06-10 | Stop reason: ALTCHOICE

## 2024-06-03 RX ORDER — ALENDRONATE SODIUM 70 MG/1
70 TABLET ORAL
Qty: 12 TABLET | Refills: 3 | Status: SHIPPED | OUTPATIENT
Start: 2024-06-03

## 2024-06-05 ENCOUNTER — APPOINTMENT (OUTPATIENT)
Dept: HEMATOLOGY/ONCOLOGY | Facility: CLINIC | Age: 59
End: 2024-06-05
Payer: COMMERCIAL

## 2024-06-10 ENCOUNTER — OFFICE VISIT (OUTPATIENT)
Dept: HEMATOLOGY/ONCOLOGY | Facility: CLINIC | Age: 59
End: 2024-06-10
Payer: COMMERCIAL

## 2024-06-10 VITALS
WEIGHT: 148.37 LBS | DIASTOLIC BLOOD PRESSURE: 52 MMHG | TEMPERATURE: 97.3 F | OXYGEN SATURATION: 98 % | RESPIRATION RATE: 16 BRPM | HEART RATE: 64 BPM | SYSTOLIC BLOOD PRESSURE: 94 MMHG | BODY MASS INDEX: 25.47 KG/M2

## 2024-06-10 DIAGNOSIS — Z17.0 MALIGNANT NEOPLASM OF UPPER-INNER QUADRANT OF RIGHT BREAST IN FEMALE, ESTROGEN RECEPTOR POSITIVE (MULTI): ICD-10-CM

## 2024-06-10 DIAGNOSIS — C50.211 MALIGNANT NEOPLASM OF UPPER-INNER QUADRANT OF RIGHT BREAST IN FEMALE, ESTROGEN RECEPTOR POSITIVE (MULTI): ICD-10-CM

## 2024-06-10 DIAGNOSIS — M81.0 AGE-RELATED OSTEOPOROSIS WITHOUT CURRENT PATHOLOGICAL FRACTURE: Primary | ICD-10-CM

## 2024-06-10 PROCEDURE — 99214 OFFICE O/P EST MOD 30 MIN: CPT | Performed by: NURSE PRACTITIONER

## 2024-06-10 RX ORDER — ANASTROZOLE 1 MG/1
1 TABLET ORAL DAILY
Qty: 30 TABLET | Refills: 11 | Status: SHIPPED | OUTPATIENT
Start: 2024-06-10 | End: 2024-06-10 | Stop reason: SDUPTHER

## 2024-06-10 RX ORDER — ANASTROZOLE 1 MG/1
1 TABLET ORAL DAILY
Qty: 30 TABLET | Refills: 11 | Status: SHIPPED | OUTPATIENT
Start: 2024-06-10 | End: 2025-06-10

## 2024-06-10 ASSESSMENT — PAIN SCALES - GENERAL: PAINLEVEL: 0-NO PAIN

## 2024-06-10 NOTE — PROGRESS NOTES
Consent:  {Telehealth Consent:46091}    Patient ID: Paulette Mercer is a 59 y.o. female.    Subjective    HPI    Objective    BSA: 1.74 meters squared  BP 94/52 (BP Location: Left arm)   Pulse 64   Temp 36.3 °C (97.3 °F)   Resp 16   Wt 67.3 kg (148 lb 5.9 oz)   SpO2 98%   BMI 25.47 kg/m²      Physical Exam    Performance Status:  {ECOG performance status:40213}    Assessment/Plan    Oncology History    No history exists.          {Assess/PlanSmartLinks:14476}         Vane Garcia PA-C

## 2024-06-10 NOTE — PROGRESS NOTES
Patient ID: Paulette Mercer is a 59 y.o. female.    Subjective    HPI  Cancer History:         Breast        AJCC Edition: 7th (AJCC), Diagnosis Date: 02-Dec-2015, IIA, T2 N0 M0         Breast        AJCC Edition: 7th (AJCC), Diagnosis Date: 04-May-2016, IIA, T1b pN1a M0         Chief Complaint: follow up   Interval History:    59-year-old  female presented Dec 2015 with right breast invasive ductal carcinoma, 2.5 cm, ER/[place] positive, HER-2 positive, as well as DCIS,   neoadjuvant chemotherapy with TCHP from 12/21/15 to 4/2016,   lumpectomy on 5/4/16 of 0.7cm with 1/7LN  (ypT1b ypN1a),   completed adjuvant XRT on 7/27/16.   started on tamoxifen since 9/1/16. She was switched to anastrozole since 1/1/19.  Completes September 2026  She was also continued on every 3 weeks Herceptin for total a year after surgery. She completed in 12/2016. LMP 1/2016.   DEXA scan in 12/2020 showed osteoporosis and she was started on Fosamax.  Repeat DEXA 12/23/22 still showed osteoporosis, but was improved.       Paulette presents to office today for routine evaluation and follow up.  Mammogram 1/26/24 benign.   She is otherwise tolerating it well with minimal hot flashes and no bone or joint pain.  She denies jaw pain or dental issues.        Objective    BSA: There is no height or weight on file to calculate BSA.  There were no vitals taken for this visit.     Physical Exam  Constitutional:       Appearance: Normal appearance.   Eyes:      Conjunctiva/sclera: Conjunctivae normal.      Pupils: Pupils are equal, round, and reactive to light.   Cardiovascular:      Rate and Rhythm: Normal rate and regular rhythm.      Pulses: Normal pulses.      Heart sounds: Normal heart sounds. No murmur heard.  Pulmonary:      Effort: Pulmonary effort is normal. No respiratory distress.      Breath sounds: Normal breath sounds. No stridor. No wheezing or rhonchi.   Abdominal:      General: There is no distension.      Palpations: Abdomen is  soft. There is no mass.      Tenderness: There is no abdominal tenderness.      Hernia: No hernia is present.   Musculoskeletal:         General: Normal range of motion.   Lymphadenopathy:      Cervical: No cervical adenopathy.   Skin:     Coloration: Skin is not jaundiced or pale.      Findings: No bruising or erythema.   Neurological:      General: No focal deficit present.      Motor: No weakness.     Performance Status:  Asymptomatic      Assessment/Plan   Right breast invasive ductal carcinoma 2.5cm, ER/VT+, Her2 +  s/p neoadjuvant TCHPcompleted 4/20/16 with lumpectomy 5/416 that showed 0.7cm tumor with 1/7 LN positive.  She completed radiation therapy 7/27/16, started Tamoxifen 9/1/16 and completed one year of HerceptinDec 2016.    LMP 2015, switched to Arimidex 1/1/19.  Will Rx      Osteoporosis treated with Fosamax, with benefit shown on DEXA 12/23/22 and will continue Fosamax .  Discussed DEXA in detail and comparison and benefit on Fosamax     FH sister with breast cancer 49yo, father with melenoma 64 yo    Plan:    Bilateral mammogram due Jan 2025  DEXA due 12/23/24  Continue arimidex and fosamax.  Discuss with dentist  Discussed benefit of 10 year plan as she was premenopausal at time of diagnosis.            Plan:    OV 6 months with labs                  Vane Garcia PA-C

## 2024-06-10 NOTE — PROGRESS NOTES
Patient ID: Paulette Mercer is a 59 y.o. female.    Subjective    HPI  Cancer History:         Breast        AJCC Edition: 7th (AJCC), Diagnosis Date: 02-Dec-2015, IIA, T2 N0 M0         Breast        AJCC Edition: 7th (AJCC), Diagnosis Date: 04-May-2016, IIA, T1b pN1a M0         Chief Complaint: follow up   Interval History:    58-year-old  female presented Dec 2015 with right breast invasive ductal carcinoma, 2.5 cm, ER/[place] positive, HER-2 positive, as well as DCIS,   neoadjuvant chemotherapy with TCHP from 12/21/15 to 4/2016,   lumpectomy on 5/4/16 of 0.7cm with 1/7LN  (ypT1b ypN1a),   completed adjuvant XRT on 7/27/16.   started on tamoxifen since 9/1/16. She was switched to anastrozole since 1/1/19.  She was also continued on every 3 weeks Herceptin for total a year after surgery. She completed in 12/2016. LMP 1/2016.   DEXA scan in 12/2020 showed osteoporosis and she was started on Fosamax.  Repeat DEXA 12/23/22 still showed osteoporosis, but was improved.       Paulette presents to office today for routine evaluation and follow up.  Mammogram 1/4/23 benign.   She is otherwise tolerating it well with minimal hot flashes and no bone or joint pain.     Objective    BSA: 1.74 meters squared  BP 94/52 (BP Location: Left arm)   Pulse 64   Temp 36.3 °C (97.3 °F)   Resp 16   Wt 67.3 kg (148 lb 5.9 oz)   SpO2 98%   BMI 25.47 kg/m²      Physical Exam  Psychiatric:      Comments: No palpable abnormalities of right breast, no notable nipple inversion     Performance Status:  Asymptomatic      Assessment/Plan   58-year-old  female presented Dec 2015 with right breast invasive ductal carcinoma, 2.5 cm, ER/[place] positive, HER-2 positive, as well as DCIS,   neoadjuvant chemotherapy with TCHP from 12/21/15 to 4/2016,   lumpectomy on 5/4/16 of 0.7cm with 1/7LN  (ypT1b ypN1a),   completed adjuvant XRT on 7/27/16.   started on tamoxifen since 9/1/16. She was switched to anastrozole since 1/1/19.  She was also  continued on every 3 weeks Herceptin for total a year after surgery. She completed in 12/2016. LMP 1/2016.   DEXA scan in 12/2020 showed osteoporosis and she was started on Fosamax.  Repeat DEXA 12/23/22 still showed osteoporosis, but was improved.           Diagnoses and all orders for this visit:  Malignant neoplasm of upper-inner quadrant of right breast in female, estrogen receptor positive (Multi)  -     Clinic Appointment Request Follow up           Vane Garcia PA-C

## 2024-08-08 ENCOUNTER — APPOINTMENT (OUTPATIENT)
Dept: OBSTETRICS AND GYNECOLOGY | Facility: CLINIC | Age: 59
End: 2024-08-08
Payer: COMMERCIAL

## 2024-08-08 VITALS
HEIGHT: 63 IN | DIASTOLIC BLOOD PRESSURE: 70 MMHG | SYSTOLIC BLOOD PRESSURE: 100 MMHG | BODY MASS INDEX: 25.16 KG/M2 | WEIGHT: 142 LBS

## 2024-08-08 DIAGNOSIS — Z12.4 CERVICAL CANCER SCREENING: ICD-10-CM

## 2024-08-08 DIAGNOSIS — Z78.0 MENOPAUSE: ICD-10-CM

## 2024-08-08 DIAGNOSIS — Z12.39 ENCOUNTER FOR SCREENING FOR MALIGNANT NEOPLASM OF BREAST, UNSPECIFIED SCREENING MODALITY: ICD-10-CM

## 2024-08-08 DIAGNOSIS — Z01.419 VISIT FOR PELVIC EXAM: Primary | ICD-10-CM

## 2024-08-08 PROCEDURE — 3008F BODY MASS INDEX DOCD: CPT | Performed by: OBSTETRICS & GYNECOLOGY

## 2024-08-08 PROCEDURE — 99396 PREV VISIT EST AGE 40-64: CPT | Performed by: OBSTETRICS & GYNECOLOGY

## 2024-08-08 PROCEDURE — 1036F TOBACCO NON-USER: CPT | Performed by: OBSTETRICS & GYNECOLOGY

## 2024-08-08 ASSESSMENT — ENCOUNTER SYMPTOMS
CARDIOVASCULAR NEGATIVE: 1
CONSTITUTIONAL NEGATIVE: 1
PSYCHIATRIC NEGATIVE: 1
GASTROINTESTINAL NEGATIVE: 1
RESPIRATORY NEGATIVE: 1
NEUROLOGICAL NEGATIVE: 1

## 2024-08-08 NOTE — PATIENT INSTRUCTIONS
Routine Gynecologic Visit:  You were seen today for a routine gyn visit with normal findings on exam  Your pap smear had normal cells and was negative for HPV in 2020, so you were not due for a pap smear today. You should still continue to get annual breast and pelvic exams in the office.  An order was placed in the system for mammogram by your breast specialist. Please get done at your earliest convenience  If you are having any gynecologic issues in the next year you should call the office. [phone] ([place]) or [phone] ([place])       Gynecologic Visit for Dyspareunia in Menopause:  You were seen today for dyspareunia (painful intercourse)   Dyspareunia is common in menopause and is generally due to changes in the vulva and vagina that occur due to dropping estrogen levels  It is advised to use a gentle water based lubricant during intercourse to minimize friction/discomfort that can heighten a pelvic pain response  Daily dryness prevention with a vaginal moisturizer such as Replens or Bonafide Revaree (www.[employer]) can help.   Sometimes vaginal estrogen replacement is beneficial. If this was discussed at your appointment and you have decided to start vaginal estrogen, a prescription will have been sent to your pharmacy and should be ready for .  Sometimes using vaginal dilators on a regular basis can help prevent pain with intercourse. These can be purchased online at www.[employer].com. These should always be used with a water based lubricant and should be cleaned between uses.   Pelvic cultures were not obtained due to absence of risk factors or clinical findings suggestive of infection  Please call the office with any concerns at [phone] ([place]) or [phone] [place]

## 2024-08-08 NOTE — PROGRESS NOTES
"Subjective   Paulette Mercer is a 59 y.o. female who presents for annual exam. The patient has no complaints today. The patient is not sexually active. GYN screening history: last pap: was normal. The patient is not taking hormone replacement therapy. Patient denies post-menopausal vaginal bleeding.. The patient wears seatbelts: yes. The patient participates in regular exercise: yes. Has the patient ever been transfused or tattooed?: no. The patient reports that there is not domestic violence in their life.     Menstrual History:  OB History          2    Para   2    Term                AB        Living   2         SAB        IAB        Ectopic        Multiple        Live Births   2                  No LMP recorded. Patient is postmenopausal.         Review of Systems   Constitutional: Negative.    Respiratory: Negative.     Cardiovascular: Negative.    Gastrointestinal: Negative.    Genitourinary: Negative.    Skin: Negative.    Neurological: Negative.    Psychiatric/Behavioral: Negative.          Objective   /70   Ht 1.6 m (5' 3\")   Wt 64.4 kg (142 lb)   BMI 25.15 kg/m²     General:   alert and oriented, in no acute distress   Heart: regular rate and rhythm, S1, S2 normal, no murmur, click, rub or gallop   Lungs: clear to auscultation bilaterally   Abdomen: soft, non-tender, without masses or organomegaly   Pelvic: Vulva normal in appearance without discoloration, rashes, lesions. Vagina is negative for blood, discharge, lesions. Uterus is small, mobile, non tender. There is no cervical motion tenderness, adnexal masses/tenderness.    Breast: No masses, skin changes, discoloration, tenderness, or nipple drainage bilaterally     Neck: Normal ROM. Thyroid normal size. No masses/tenderness     Lymph: No LAD   Psyched: Normal mood/affect     Lab Review      Assessment/Plan   Problem List Items Addressed This Visit    None  Visit Diagnoses         Codes    Visit for pelvic exam    -  Primary " Z01.419    Cervical cancer screening     Z12.4    Encounter for screening for malignant neoplasm of breast, unspecified screening modality     Z12.39    Menopause     Z78.0        1. Pelvic and breast exam wnl  2. Rec for mammogram given, counseled in breast awareness/self exam  3. Pap NIL/neg 2020   4. Colonoscopy up to date  5. Patient asymptomatic without PMB. No HRT/ Osphena. Dexa ordered    RTO 1 year  Lis[place] Garcia, DO

## 2024-12-12 ENCOUNTER — TELEPHONE (OUTPATIENT)
Dept: HEMATOLOGY/ONCOLOGY | Facility: CLINIC | Age: 59
End: 2024-12-12
Payer: COMMERCIAL

## 2024-12-12 DIAGNOSIS — M81.6 LOCALIZED OSTEOPOROSIS WITHOUT CURRENT PATHOLOGICAL FRACTURE: ICD-10-CM

## 2024-12-12 NOTE — TELEPHONE ENCOUNTER
Medication: Fosamax  Dose: 70 mg   Directions: 1 tab PO every 7 days  Pharmacy: [facility] in [place]  Last date filled: 6/3/24  Last follow up: 6/10/24  Next follow up: 2/5/25

## 2024-12-13 DIAGNOSIS — M81.6 LOCALIZED OSTEOPOROSIS WITHOUT CURRENT PATHOLOGICAL FRACTURE: ICD-10-CM

## 2024-12-13 RX ORDER — ALENDRONATE SODIUM 70 MG/1
70 TABLET ORAL
Qty: 12 TABLET | Refills: 3 | Status: SHIPPED | OUTPATIENT
Start: 2024-12-13

## 2024-12-23 ENCOUNTER — TELEPHONE (OUTPATIENT)
Dept: HEMATOLOGY/ONCOLOGY | Facility: CLINIC | Age: 59
End: 2024-12-23
Payer: COMMERCIAL

## 2024-12-23 DIAGNOSIS — C50.211 MALIGNANT NEOPLASM OF UPPER-INNER QUADRANT OF RIGHT BREAST IN FEMALE, ESTROGEN RECEPTOR POSITIVE: ICD-10-CM

## 2024-12-23 DIAGNOSIS — Z17.0 MALIGNANT NEOPLASM OF UPPER-INNER QUADRANT OF RIGHT BREAST IN FEMALE, ESTROGEN RECEPTOR POSITIVE: ICD-10-CM

## 2024-12-23 RX ORDER — ANASTROZOLE 1 MG/1
1 TABLET ORAL DAILY
Qty: 30 TABLET | Refills: 11 | Status: SHIPPED | OUTPATIENT
Start: 2024-12-23 | End: 2025-12-23

## 2024-12-23 NOTE — TELEPHONE ENCOUNTER
Medication: Arimidex  Dose: 1 mg  Directions: 1 tab PO daily  Pharmacy: [facility] in [place]  Last date filled: 6/10/24  Last follow up: 6/10/24  Next follow up: 2/5/25

## 2025-01-30 ENCOUNTER — HOSPITAL ENCOUNTER (OUTPATIENT)
Dept: RADIOLOGY | Facility: HOSPITAL | Age: 60
Discharge: HOME | End: 2025-01-30
Payer: COMMERCIAL

## 2025-01-30 ENCOUNTER — LAB (OUTPATIENT)
Dept: LAB | Facility: HOSPITAL | Age: 60
End: 2025-01-30
Payer: COMMERCIAL

## 2025-01-30 DIAGNOSIS — Z17.0 MALIGNANT NEOPLASM OF UPPER-INNER QUADRANT OF RIGHT BREAST IN FEMALE, ESTROGEN RECEPTOR POSITIVE: ICD-10-CM

## 2025-01-30 DIAGNOSIS — C50.211 MALIGNANT NEOPLASM OF UPPER-INNER QUADRANT OF RIGHT BREAST IN FEMALE, ESTROGEN RECEPTOR POSITIVE: ICD-10-CM

## 2025-01-30 DIAGNOSIS — M81.0 AGE-RELATED OSTEOPOROSIS WITHOUT CURRENT PATHOLOGICAL FRACTURE: ICD-10-CM

## 2025-01-30 LAB
ALBUMIN SERPL BCP-MCNC: 4.1 G/DL (ref 3.4–5)
ALP SERPL-CCNC: 52 U/L (ref 33–136)
ALT SERPL W P-5'-P-CCNC: 14 U/L (ref 7–45)
ANION GAP SERPL CALC-SCNC: 11 MMOL/L (ref 10–20)
AST SERPL W P-5'-P-CCNC: 17 U/L (ref 9–39)
BASOPHILS # BLD AUTO: 0.05 X10*3/UL (ref 0–0.1)
BASOPHILS NFR BLD AUTO: 0.7 %
BILIRUB SERPL-MCNC: 0.4 MG/DL (ref 0–1.2)
BUN SERPL-MCNC: 12 MG/DL (ref 6–23)
CALCIUM SERPL-MCNC: 10.1 MG/DL (ref 8.6–10.3)
CHLORIDE SERPL-SCNC: 103 MMOL/L (ref 98–107)
CO2 SERPL-SCNC: 27 MMOL/L (ref 21–32)
CREAT SERPL-MCNC: 0.78 MG/DL (ref 0.5–1.05)
EGFRCR SERPLBLD CKD-EPI 2021: 87 ML/MIN/1.73M*2
EOSINOPHIL # BLD AUTO: 0.09 X10*3/UL (ref 0–0.7)
EOSINOPHIL NFR BLD AUTO: 1.3 %
ERYTHROCYTE [DISTWIDTH] IN BLOOD BY AUTOMATED COUNT: 11.6 % (ref 11.5–14.5)
GLUCOSE SERPL-MCNC: 84 MG/DL (ref 74–99)
HCT VFR BLD AUTO: 38.9 % (ref 36–46)
HGB BLD-MCNC: 12.6 G/DL (ref 12–16)
IMM GRANULOCYTES # BLD AUTO: 0.01 X10*3/UL (ref 0–0.7)
IMM GRANULOCYTES NFR BLD AUTO: 0.1 % (ref 0–0.9)
LYMPHOCYTES # BLD AUTO: 1.51 X10*3/UL (ref 1.2–4.8)
LYMPHOCYTES NFR BLD AUTO: 22.3 %
MCH RBC QN AUTO: 29.2 PG (ref 26–34)
MCHC RBC AUTO-ENTMCNC: 32.4 G/DL (ref 32–36)
MCV RBC AUTO: 90 FL (ref 80–100)
MONOCYTES # BLD AUTO: 0.67 X10*3/UL (ref 0.1–1)
MONOCYTES NFR BLD AUTO: 9.9 %
NEUTROPHILS # BLD AUTO: 4.45 X10*3/UL (ref 1.2–7.7)
NEUTROPHILS NFR BLD AUTO: 65.7 %
PLATELET # BLD AUTO: 328 X10*3/UL (ref 150–450)
POTASSIUM SERPL-SCNC: 3.9 MMOL/L (ref 3.5–5.3)
PROT SERPL-MCNC: 7.2 G/DL (ref 6.4–8.2)
RBC # BLD AUTO: 4.32 X10*6/UL (ref 4–5.2)
SODIUM SERPL-SCNC: 137 MMOL/L (ref 136–145)
WBC # BLD AUTO: 6.8 X10*3/UL (ref 4.4–11.3)

## 2025-01-30 PROCEDURE — 86300 IMMUNOASSAY TUMOR CA 15-3: CPT | Mod: GEALAB

## 2025-01-30 PROCEDURE — 85025 COMPLETE CBC W/AUTO DIFF WBC: CPT

## 2025-01-30 PROCEDURE — 77080 DXA BONE DENSITY AXIAL: CPT

## 2025-01-30 PROCEDURE — 77067 SCR MAMMO BI INCL CAD: CPT

## 2025-01-30 PROCEDURE — 77063 BREAST TOMOSYNTHESIS BI: CPT | Performed by: RADIOLOGY

## 2025-01-30 PROCEDURE — 80053 COMPREHEN METABOLIC PANEL: CPT

## 2025-01-30 PROCEDURE — 77080 DXA BONE DENSITY AXIAL: CPT | Performed by: RADIOLOGY

## 2025-01-30 PROCEDURE — 36415 COLL VENOUS BLD VENIPUNCTURE: CPT

## 2025-01-30 PROCEDURE — 77067 SCR MAMMO BI INCL CAD: CPT | Performed by: RADIOLOGY

## 2025-01-31 LAB — CANCER AG27-29 SERPL-ACNC: 11.4 U/ML (ref 0–38.6)

## 2025-02-05 ENCOUNTER — OFFICE VISIT (OUTPATIENT)
Dept: HEMATOLOGY/ONCOLOGY | Facility: CLINIC | Age: 60
End: 2025-02-05
Payer: COMMERCIAL

## 2025-02-05 VITALS
OXYGEN SATURATION: 100 % | HEIGHT: 63 IN | BODY MASS INDEX: 26.19 KG/M2 | RESPIRATION RATE: 16 BRPM | WEIGHT: 147.82 LBS | TEMPERATURE: 98.4 F | DIASTOLIC BLOOD PRESSURE: 58 MMHG | HEART RATE: 59 BPM | SYSTOLIC BLOOD PRESSURE: 100 MMHG

## 2025-02-05 DIAGNOSIS — Z17.0 MALIGNANT NEOPLASM OF UPPER-INNER QUADRANT OF RIGHT BREAST IN FEMALE, ESTROGEN RECEPTOR POSITIVE: Primary | ICD-10-CM

## 2025-02-05 DIAGNOSIS — Z17.0 MALIGNANT NEOPLASM OF UPPER-INNER QUADRANT OF RIGHT BREAST IN FEMALE, ESTROGEN RECEPTOR POSITIVE: ICD-10-CM

## 2025-02-05 DIAGNOSIS — M81.6 LOCALIZED OSTEOPOROSIS WITHOUT CURRENT PATHOLOGICAL FRACTURE: ICD-10-CM

## 2025-02-05 DIAGNOSIS — C50.211 MALIGNANT NEOPLASM OF UPPER-INNER QUADRANT OF RIGHT BREAST IN FEMALE, ESTROGEN RECEPTOR POSITIVE: Primary | ICD-10-CM

## 2025-02-05 DIAGNOSIS — C50.211 MALIGNANT NEOPLASM OF UPPER-INNER QUADRANT OF RIGHT BREAST IN FEMALE, ESTROGEN RECEPTOR POSITIVE: ICD-10-CM

## 2025-02-05 PROCEDURE — 3008F BODY MASS INDEX DOCD: CPT | Performed by: NURSE PRACTITIONER

## 2025-02-05 PROCEDURE — 99214 OFFICE O/P EST MOD 30 MIN: CPT | Performed by: NURSE PRACTITIONER

## 2025-02-05 PROCEDURE — 1036F TOBACCO NON-USER: CPT | Performed by: NURSE PRACTITIONER

## 2025-02-05 RX ORDER — ANASTROZOLE 1 MG/1
1 TABLET ORAL DAILY
Qty: 30 TABLET | Refills: 11 | Status: SHIPPED | OUTPATIENT
Start: 2025-02-05 | End: 2026-02-05

## 2025-02-05 RX ORDER — ALENDRONATE SODIUM 70 MG/1
70 TABLET ORAL
Qty: 12 TABLET | Refills: 3 | Status: SHIPPED | OUTPATIENT
Start: 2025-02-05

## 2025-02-05 SDOH — ECONOMIC STABILITY: FOOD INSECURITY: WITHIN THE PAST 12 MONTHS, THE FOOD YOU BOUGHT JUST DIDN'T LAST AND YOU DIDN'T HAVE MONEY TO GET MORE.: NEVER TRUE

## 2025-02-05 SDOH — ECONOMIC STABILITY: FOOD INSECURITY: WITHIN THE PAST 12 MONTHS, YOU WORRIED THAT YOUR FOOD WOULD RUN OUT BEFORE YOU GOT THE MONEY TO BUY MORE.: NEVER TRUE

## 2025-02-05 ASSESSMENT — PAIN SCALES - GENERAL: PAINLEVEL_OUTOF10: 0-NO PAIN

## 2025-02-05 NOTE — PROGRESS NOTES
"Patient ID: Paulette Mercer is a 59 y.o. female.    Subjective    HPI  Cancer History:         Breast        AJCC Edition: 7th (AJCC), Diagnosis Date: 02-Dec-2015, IIA, T2 N0 M0         Breast        AJCC Edition: 7th (AJCC), Diagnosis Date: 04-May-2016, IIA, T1b pN1a M0         Chief Complaint: follow up   Interval History:    58-year-old  female presented Dec 2015 with right breast invasive ductal carcinoma, 2.5 cm, ER/CT positive, HER-2 positive, as well as DCIS,   neoadjuvant chemotherapy with TCHP from 12/21/15 to 4/2016,   lumpectomy on 5/4/16 of 0.7cm with 1/7LN  (ypT1b ypN1a),   completed adjuvant XRT on 7/27/16.   started on tamoxifen since 9/1/16. She was switched to anastrozole since 1/1/19.  10 year plan for antihormonal therapy completed Sept 2026  She was also continued on every 3 weeks Herceptin for total a year after surgery. She completed in 12/2016. LMP 1/2016.   DEXA scan in 12/2020 showed osteoporosis and she was started on Fosamax.  Repeat DEXA 1/30/25 still showed osteoporosis, but was improved.       Paulette presents to office today for routine evaluation and follow up.  Mammogram 1/30/25 benign.   She is otherwise tolerating it well with minimal hot flashes and no bone or joint pain.           Objective    BSA: 1.73 meters squared  /58 (BP Location: Left arm, Patient Position: Sitting, BP Cuff Size: Adult) Comment: Manual cuff  Pulse 59   Temp 36.9 °C (98.4 °F) (Temporal)   Resp 16   Ht (S) 1.605 m (5' 3.19\")   Wt 67 kg (147 lb 13.1 oz)   SpO2 100%   BMI 26.03 kg/m²      Physical Exam  Constitutional:       Appearance: Normal appearance.   Eyes:      Conjunctiva/sclera: Conjunctivae normal.      Pupils: Pupils are equal, round, and reactive to light.   Cardiovascular:      Rate and Rhythm: Normal rate and regular rhythm.      Pulses: Normal pulses.      Heart sounds: Normal heart sounds. No murmur heard.  Pulmonary:      Effort: Pulmonary effort is normal. No " respiratory distress.      Breath sounds: Normal breath sounds. No wheezing.   Abdominal:      General: There is no distension.      Palpations: Abdomen is soft.      Tenderness: There is no abdominal tenderness.   Musculoskeletal:         General: Normal range of motion.   Lymphadenopathy:      Cervical: No cervical adenopathy.   Skin:     Coloration: Skin is not jaundiced or pale.      Findings: No bruising.   Neurological:      Motor: No weakness.     Performance Status:  Asymptomatic      Assessment/Plan   58-year-old  female presented Dec 2015 with right breast invasive ductal carcinoma, 2.5 cm, ER/MD positive, HER-2 positive, as well as DCIS,   neoadjuvant chemotherapy with TCHP from 12/21/15 to 4/2016,   lumpectomy on 5/4/16 of 0.7cm with 1/7LN  (ypT1b ypN1a),   completed adjuvant XRT on 7/27/16.   started on tamoxifen since 9/1/16. She was switched to anastrozole since 1/1/19.  She was also continued on every 3 weeks Herceptin for total a year after surgery. She completed in 12/2016. LMP 1/2016.   DEXA scan in 12/2020 showed osteoporosis and she was started on Fosamax.  Repeat DEXA 12/23/22 still showed osteoporosis, but was improved.     Will follow up with another provider in 6 months         Diagnoses and all orders for this visit:  Malignant neoplasm of upper-inner quadrant of right breast in female, estrogen receptor positive  -     CBC and Auto Differential; Future  -     Comprehensive Metabolic Panel; Future  -     Cancer Antigen 27-29; Future  -     Clinic Appointment Request Follow up  -     CBC and Auto Differential; Future  -     Comprehensive Metabolic Panel; Future  -     Clinic Appointment Request Follow up; Future           Vane Garcia PA-C

## 2025-03-03 ENCOUNTER — APPOINTMENT (OUTPATIENT)
Dept: PRIMARY CARE | Facility: CLINIC | Age: 60
End: 2025-03-03
Payer: COMMERCIAL

## 2025-03-03 VITALS
HEIGHT: 64 IN | WEIGHT: 148 LBS | SYSTOLIC BLOOD PRESSURE: 118 MMHG | BODY MASS INDEX: 25.27 KG/M2 | DIASTOLIC BLOOD PRESSURE: 74 MMHG

## 2025-03-03 DIAGNOSIS — Z23 NEED FOR PNEUMOCOCCAL VACCINATION: ICD-10-CM

## 2025-03-03 DIAGNOSIS — J31.0 CHRONIC RHINITIS: ICD-10-CM

## 2025-03-03 DIAGNOSIS — Z00.00 HEALTH CARE MAINTENANCE: ICD-10-CM

## 2025-03-03 DIAGNOSIS — J30.9 ALLERGIC RHINITIS, UNSPECIFIED SEASONALITY, UNSPECIFIED TRIGGER: ICD-10-CM

## 2025-03-03 DIAGNOSIS — Z23 NEED FOR SHINGLES VACCINE: Primary | ICD-10-CM

## 2025-03-03 RX ORDER — CETIRIZINE HYDROCHLORIDE 10 MG/1
10 TABLET ORAL DAILY
Qty: 90 TABLET | Refills: 1 | Status: SHIPPED | OUTPATIENT
Start: 2025-03-03 | End: 2025-08-30

## 2025-03-03 ASSESSMENT — PATIENT HEALTH QUESTIONNAIRE - PHQ9
SUM OF ALL RESPONSES TO PHQ9 QUESTIONS 1 AND 2: 0
2. FEELING DOWN, DEPRESSED OR HOPELESS: NOT AT ALL
1. LITTLE INTEREST OR PLEASURE IN DOING THINGS: NOT AT ALL

## 2025-03-03 NOTE — ASSESSMENT & PLAN NOTE
Update Pneumococcal and Zoster immunizations today.  Will get updated Tdap in 2 months when she follows up for second Zoster dose.  Ordered Lipid Panel and TSH

## 2025-03-03 NOTE — PROGRESS NOTES
"Subjective   Paulette Mercer is a 59 y.o. female who presents for annual health maintenance.    HPI  Health Maintenance:   Pap: last done 2020 with GYN, repeats 2025  Mammogram: last done January 2025, follows with with oncology for breast cancer   Colonoscopy: last done 2022, recommended repeat in 5 years   DEXA: January 2025, osteoporosis L femoral neck   Immunizations: Needs updated Tdap, Pneumo, Zoster  Sees dentist and eye doctor regularly   Diet: Tries to maintain a vegetarian diet with the meat about 1-2 days a week   Exercise: Active with walking and a [employer] membership, goes multiple days a week  EtOH use: Occasionally socially   Non-smoker  Denies other drug or substance use    Allergic Rhinitis   Patient states on and off she has eye dryness and irritation   States these symptoms are similar to what her sister has for her allergies   If covered by her insurance, she would like to try Zyrtec    ROS:   All pertinent positive symptoms are included in the history of present illness.  All other systems have been reviewed and are negative and noncontributory to this patient's current ailments.    Objective     /74   Ht 1.626 m (5' 4\")   Wt 67.1 kg (148 lb)   BMI 25.40 kg/m²     Physical Exam   CONSTITUTIONAL - well nourished, well developed, looks like stated age, in no acute distress, not ill-appearing, and not tired appearing  SKIN - normal skin color and pigmentation, normal skin turgor without rash, lesions, or nodules visualized  HEAD - no trauma, normocephalic  EYES - pupils are equal and reactive to light, extraocular muscles are intact, and normal external exam  ENT - TM's intact, no injection, no signs of infection, uvula midline, normal tongue movement and throat normal, no exudate, nasal passage without discharge and patent  NECK - supple without rigidity, no neck mass was observed, no thyromegaly or thyroid nodules  CHEST - clear to auscultation, no wheezing, no crackles and no rales, good " effort  CARDIAC - regular rate and regular rhythm, no skipped beats, no murmur  ABDOMEN - no organomegaly, soft, nontender, nondistended, normal bowel sounds, no guarding/rebound/rigidity, negative McBurney sign and negative Mckinney sign  EXTREMITIES - no obvious or evident edema, no obvious or evident deformities  NEUROLOGICAL - normal gait, normal balance, normal motor, no ataxia, alert, oriented and no focal signs  PSYCHIATRIC - alert, pleasant and cordial, age-appropriate  IMMUNOLOGIC - no cervical lymphadenopathy    Assessment/Plan   Problem List Items Addressed This Visit             ICD-10-CM    Chronic rhinitis J31.0     Will send in Zytrec for symptomatic relief          Health care maintenance Z00.00     Update Pneumococcal and Zoster immunizations today.  Will get updated Tdap in 2 months when she follows up for second Zoster dose.  Ordered Lipid Panel and TSH           Relevant Orders    Lipid panel (Completed)    Tsh With Reflex To Free T4 If Abnormal (Completed)     Other Visit Diagnoses         Codes    Need for shingles vaccine    -  Primary Z23    Relevant Orders    Zoster vaccine, recombinant, adult (SHINGRIX) (Completed)    Need for pneumococcal vaccination     Z23    Relevant Orders    Pneumococcal conjugate vaccine, 20-valent (PREVNAR 20) (Completed)    Allergic rhinitis, unspecified seasonality, unspecified trigger     J30.9    Relevant Medications    cetirizine (ZyrTEC) 10 mg tablet            Kashif Rodriguez  [facility] OMS IV      I have reviewed with medical students note including the assessment and plan. I agree with stated above. I placed the orders for this visit and will follow up with the results.    Patient was staffed with Dr. Sarmiento,  Yulissa Andino, DO, PGY-3  [facility]

## 2025-03-05 LAB
CHOLEST SERPL-MCNC: 268 MG/DL
CHOLEST/HDLC SERPL: 4.3 (CALC)
HDLC SERPL-MCNC: 62 MG/DL
LDLC SERPL CALC-MCNC: 174 MG/DL (CALC)
NONHDLC SERPL-MCNC: 206 MG/DL (CALC)
TRIGL SERPL-MCNC: 169 MG/DL
TSH SERPL-ACNC: 1.68 MIU/L (ref 0.4–4.5)

## 2025-04-14 ENCOUNTER — TELEPHONE (OUTPATIENT)
Dept: OBSTETRICS AND GYNECOLOGY | Facility: CLINIC | Age: 60
End: 2025-04-14
Payer: COMMERCIAL

## 2025-05-08 ENCOUNTER — APPOINTMENT (OUTPATIENT)
Dept: PRIMARY CARE | Facility: CLINIC | Age: 60
End: 2025-05-08
Payer: COMMERCIAL

## 2025-05-23 ENCOUNTER — APPOINTMENT (OUTPATIENT)
Dept: OBSTETRICS AND GYNECOLOGY | Facility: CLINIC | Age: 60
End: 2025-05-23
Payer: COMMERCIAL

## 2025-05-23 VITALS — DIASTOLIC BLOOD PRESSURE: 76 MMHG | BODY MASS INDEX: 24.72 KG/M2 | SYSTOLIC BLOOD PRESSURE: 120 MMHG | WEIGHT: 144 LBS

## 2025-05-23 DIAGNOSIS — Z12.4 CERVICAL CANCER SCREENING: ICD-10-CM

## 2025-05-23 DIAGNOSIS — Z12.39 ENCOUNTER FOR BREAST CANCER SCREENING USING NON-MAMMOGRAM MODALITY: ICD-10-CM

## 2025-05-23 DIAGNOSIS — Z78.0 MENOPAUSE: ICD-10-CM

## 2025-05-23 DIAGNOSIS — Z01.419 WELL WOMAN EXAM: Primary | ICD-10-CM

## 2025-05-23 PROBLEM — F19.21 HISTORY OF SUBSTANCE DEPENDENCE (MULTI): Status: RESOLVED | Noted: 2024-03-22 | Resolved: 2025-05-23

## 2025-05-23 PROCEDURE — 99396 PREV VISIT EST AGE 40-64: CPT | Performed by: OBSTETRICS & GYNECOLOGY

## 2025-05-23 PROCEDURE — 1036F TOBACCO NON-USER: CPT | Performed by: OBSTETRICS & GYNECOLOGY

## 2025-05-23 ASSESSMENT — ENCOUNTER SYMPTOMS
PSYCHIATRIC NEGATIVE: 1
MUSCULOSKELETAL NEGATIVE: 1
NEUROLOGICAL NEGATIVE: 1
RESPIRATORY NEGATIVE: 1
CONSTITUTIONAL NEGATIVE: 1
GASTROINTESTINAL NEGATIVE: 1
CARDIOVASCULAR NEGATIVE: 1

## 2025-05-23 NOTE — PROGRESS NOTES
Subjective   Paulette Mercer is a 60 y.o. female who presents for annual exam. The patient has no complaints today. The patient is not sexually active. GYN screening history: last pap: was normal. The patient is not taking hormone replacement therapy. Patient denies post-menopausal vaginal bleeding.. The patient wears seatbelts: yes. The patient participates in regular exercise: yes. Has the patient ever been transfused or tattooed?: not asked. The patient reports that there is not domestic violence in their life.     Menstrual History:  OB History          2    Para   2    Term   2            AB        Living   2         SAB        IAB        Ectopic        Multiple        Live Births   2                  No LMP recorded. Patient is postmenopausal.         Review of Systems   Constitutional: Negative.    Respiratory: Negative.     Cardiovascular: Negative.    Gastrointestinal: Negative.    Genitourinary: Negative.    Musculoskeletal: Negative.    Skin: Negative.    Neurological: Negative.    Psychiatric/Behavioral: Negative.          Objective   /76   Wt 65.3 kg (144 lb)   BMI 24.72 kg/m²     General:   alert and oriented, in no acute distress   Heart: regular rate and rhythm, S1, S2 normal, no murmur, click, rub or gallop   Lungs: clear to auscultation bilaterally   Abdomen: soft, non-tender, without masses or organomegaly   Pelvic: Vulva normal in appearance without discoloration, rashes, lesions. Urethral meatus normal in appearance, without masses. Vagina is negative for blood, discharge, lesions. Uterus is small, mobile, non tender. There is no cervical motion tenderness, adnexal masses/tenderness. Perineum and anus with normal architecture and without rashes, lesions, discoloration.     Breast: No masses, skin changes, discoloration, tenderness, or nipple drainage bilaterally     Neck: Normal ROM. Thyroid normal size. No masses/tenderness     Lymph: No LAD   Psych: Normal mood/affect      Lab Review      Assessment/Plan   Problem List Items Addressed This Visit    None  Visit Diagnoses         Codes      Well woman exam    -  Primary Z01.419      Cervical cancer screening     Z12.4    Relevant Orders    THINPREP PAP      Encounter for breast cancer screening using non-mammogram modality     Z12.39      Menopause     Z78.0        1. Pelvic and breast exam wnl  2. Rec for mammogram given, counseled in breast awareness/self exam  3. Pap/cotest pending    4. Colonoscopy up to date  5. Patient asymptomatic without PMB. No HRT/ Osphena. Dexa up to date    RTO 1 year  Lisbet Garcia DO

## 2025-05-23 NOTE — PATIENT INSTRUCTIONS
Routine Gynecologic Visit:  You were seen today for a routine gyn visit with normal findings on exam  You were due for a pap smear today. You should still continue to get annual breast and pelvic exams in the office.  Continue to follow up with your oncologist for breast cancer follow up and screening  If you are having any gynecologic issues in the next year you should call the office. [phone] ([place]) or [phone] ([place])

## 2025-05-29 ENCOUNTER — APPOINTMENT (OUTPATIENT)
Dept: OBSTETRICS AND GYNECOLOGY | Facility: CLINIC | Age: 60
End: 2025-05-29
Payer: COMMERCIAL

## 2025-06-10 LAB
CYTOLOGY CMNT CVX/VAG CYTO-IMP: NORMAL
HPV HR 12 DNA GENITAL QL NAA+PROBE: NEGATIVE
HPV HR GENOTYPES PNL CVX NAA+PROBE: NEGATIVE
HPV16 DNA SPEC QL NAA+PROBE: NEGATIVE
HPV18 DNA SPEC QL NAA+PROBE: NEGATIVE
LAB AP HPV GENOTYPE QUESTION: YES
LAB AP HPV HR: NORMAL
LABORATORY COMMENT REPORT: NORMAL
LABORATORY COMMENT REPORT: NORMAL
MENSTRUAL HX REPORTED: NORMAL
PATH REPORT.TOTAL CANCER: NORMAL

## 2025-06-12 ENCOUNTER — APPOINTMENT (OUTPATIENT)
Facility: CLINIC | Age: 60
End: 2025-06-12
Payer: COMMERCIAL

## 2025-06-12 DIAGNOSIS — Z23 NEED FOR SHINGLES VACCINE: ICD-10-CM

## 2025-06-12 DIAGNOSIS — Z00.00 HEALTH CARE MAINTENANCE: ICD-10-CM

## 2025-06-12 PROCEDURE — 90715 TDAP VACCINE 7 YRS/> IM: CPT | Performed by: STUDENT IN AN ORGANIZED HEALTH CARE EDUCATION/TRAINING PROGRAM

## 2025-06-12 PROCEDURE — 90750 HZV VACC RECOMBINANT IM: CPT | Performed by: STUDENT IN AN ORGANIZED HEALTH CARE EDUCATION/TRAINING PROGRAM

## 2025-06-12 PROCEDURE — 90472 IMMUNIZATION ADMIN EACH ADD: CPT | Performed by: STUDENT IN AN ORGANIZED HEALTH CARE EDUCATION/TRAINING PROGRAM

## 2025-06-12 PROCEDURE — 90471 IMMUNIZATION ADMIN: CPT | Performed by: STUDENT IN AN ORGANIZED HEALTH CARE EDUCATION/TRAINING PROGRAM

## 2025-07-01 ENCOUNTER — LAB (OUTPATIENT)
Dept: LAB | Facility: HOSPITAL | Age: 60
End: 2025-07-01
Payer: COMMERCIAL

## 2025-07-01 DIAGNOSIS — Z17.0 MALIGNANT NEOPLASM OF UPPER-INNER QUADRANT OF RIGHT BREAST IN FEMALE, ESTROGEN RECEPTOR POSITIVE: ICD-10-CM

## 2025-07-01 DIAGNOSIS — C50.211 MALIGNANT NEOPLASM OF UPPER-INNER QUADRANT OF RIGHT BREAST IN FEMALE, ESTROGEN RECEPTOR POSITIVE: ICD-10-CM

## 2025-07-01 LAB
ALBUMIN SERPL BCP-MCNC: 4.7 G/DL (ref 3.4–5)
ALP SERPL-CCNC: 43 U/L (ref 33–136)
ALT SERPL W P-5'-P-CCNC: 26 U/L (ref 7–45)
ANION GAP SERPL CALC-SCNC: 16 MMOL/L (ref 10–20)
AST SERPL W P-5'-P-CCNC: 27 U/L (ref 9–39)
BASOPHILS # BLD AUTO: 0.05 X10*3/UL (ref 0–0.1)
BASOPHILS NFR BLD AUTO: 0.8 %
BILIRUB SERPL-MCNC: 0.6 MG/DL (ref 0–1.2)
BUN SERPL-MCNC: 9 MG/DL (ref 6–23)
CALCIUM SERPL-MCNC: 9.8 MG/DL (ref 8.6–10.3)
CHLORIDE SERPL-SCNC: 102 MMOL/L (ref 98–107)
CO2 SERPL-SCNC: 26 MMOL/L (ref 21–32)
CREAT SERPL-MCNC: 0.64 MG/DL (ref 0.5–1.05)
EGFRCR SERPLBLD CKD-EPI 2021: >90 ML/MIN/1.73M*2
EOSINOPHIL # BLD AUTO: 0.06 X10*3/UL (ref 0–0.7)
EOSINOPHIL NFR BLD AUTO: 1 %
ERYTHROCYTE [DISTWIDTH] IN BLOOD BY AUTOMATED COUNT: 12.2 % (ref 11.5–14.5)
GLUCOSE SERPL-MCNC: 112 MG/DL (ref 74–99)
HCT VFR BLD AUTO: 39.6 % (ref 36–46)
HGB BLD-MCNC: 13.1 G/DL (ref 12–16)
IMM GRANULOCYTES # BLD AUTO: 0 X10*3/UL (ref 0–0.7)
IMM GRANULOCYTES NFR BLD AUTO: 0 % (ref 0–0.9)
LYMPHOCYTES # BLD AUTO: 1.79 X10*3/UL (ref 1.2–4.8)
LYMPHOCYTES NFR BLD AUTO: 28.6 %
MCH RBC QN AUTO: 29.4 PG (ref 26–34)
MCHC RBC AUTO-ENTMCNC: 33.1 G/DL (ref 32–36)
MCV RBC AUTO: 89 FL (ref 80–100)
MONOCYTES # BLD AUTO: 0.56 X10*3/UL (ref 0.1–1)
MONOCYTES NFR BLD AUTO: 9 %
NEUTROPHILS # BLD AUTO: 3.79 X10*3/UL (ref 1.2–7.7)
NEUTROPHILS NFR BLD AUTO: 60.6 %
PLATELET # BLD AUTO: 286 X10*3/UL (ref 150–450)
POTASSIUM SERPL-SCNC: 4 MMOL/L (ref 3.5–5.3)
PROT SERPL-MCNC: 6.9 G/DL (ref 6.4–8.2)
RBC # BLD AUTO: 4.46 X10*6/UL (ref 4–5.2)
SODIUM SERPL-SCNC: 140 MMOL/L (ref 136–145)
WBC # BLD AUTO: 6.3 X10*3/UL (ref 4.4–11.3)

## 2025-07-01 PROCEDURE — 80053 COMPREHEN METABOLIC PANEL: CPT

## 2025-07-01 PROCEDURE — 85025 COMPLETE CBC W/AUTO DIFF WBC: CPT

## 2025-07-01 PROCEDURE — 36415 COLL VENOUS BLD VENIPUNCTURE: CPT

## 2025-07-08 ENCOUNTER — OFFICE VISIT (OUTPATIENT)
Dept: HEMATOLOGY/ONCOLOGY | Facility: CLINIC | Age: 60
End: 2025-07-08
Payer: COMMERCIAL

## 2025-07-08 VITALS
DIASTOLIC BLOOD PRESSURE: 56 MMHG | RESPIRATION RATE: 16 BRPM | HEART RATE: 55 BPM | TEMPERATURE: 97.3 F | SYSTOLIC BLOOD PRESSURE: 104 MMHG | BODY MASS INDEX: 25.05 KG/M2 | OXYGEN SATURATION: 99 % | WEIGHT: 145.94 LBS

## 2025-07-08 DIAGNOSIS — C50.211 MALIGNANT NEOPLASM OF UPPER-INNER QUADRANT OF RIGHT BREAST IN FEMALE, ESTROGEN RECEPTOR POSITIVE: Primary | ICD-10-CM

## 2025-07-08 DIAGNOSIS — Z17.0 MALIGNANT NEOPLASM OF UPPER-INNER QUADRANT OF RIGHT BREAST IN FEMALE, ESTROGEN RECEPTOR POSITIVE: Primary | ICD-10-CM

## 2025-07-08 DIAGNOSIS — M81.6 LOCALIZED OSTEOPOROSIS WITHOUT CURRENT PATHOLOGICAL FRACTURE: ICD-10-CM

## 2025-07-08 PROCEDURE — 99215 OFFICE O/P EST HI 40 MIN: CPT

## 2025-07-08 ASSESSMENT — PAIN SCALES - GENERAL: PAINLEVEL_OUTOF10: 0-NO PAIN

## 2025-07-08 NOTE — PROGRESS NOTES
"[facility]    PATIENT VISIT INFORMATION    Visit Type: Follow-up visit    Referring Provider: Transfer of care from Vane Garcia PA-C  Reason for referral: Breast cancer triple positive surveillance  Primary Practice Provider: Mariya Sarmiento DO    CANCER/HEMATOLGOY HISTORY    60-year-old  female presented Dec 2015 with right breast invasive ductal carcinoma, 2.5 cm, ER/SC positive, HER-2 positive, as well as DCIS neoadjuvant chemotherapy with TCHP from 12/21/15 to 4/2016,   lumpectomy on 5/4/16 of 0.7cm with 1/7LN  (ypT1b ypN1a),   completed adjuvant XRT on 7/27/16.   started on tamoxifen since 9/1/16. She was switched to anastrozole since 1/1/19.  10 year plan for antihormonal therapy completed Sept 2026  She was also continued on every 3 weeks Herceptin for total a year after surgery. She completed in 12/2016. LMP 1/2016.    DEXA scan in 12/2020 showed osteoporosis and she was started on Fosamax.  Repeat DEXA 1/30/25 still showed osteoporosis, but was improved.    Mammogram 1/30/25 benign.   She is otherwise tolerating it well with minimal hot flashes and no bone or joint pain.     BI mammo bilateral screening tomosynthesis 1/31/2025  No mammographic evidence of malignancy.   XR DEXA bone density  1/31/2025   According to World Health Organization criteria,  classification is osteoporosis.    Cancer History:         Breast        AJCC Edition: 7th (AJCC), Diagnosis Date: 02-Dec-2015, IIA, T2 N0 M0         Breast        AJCC Edition: 7th (AJCC), Diagnosis Date: 04-May-2016, IIA, T1b pN1a M0         HISTORY OF PRESENT ILLNESS     ID Statement: Paulette Mercer 60-year-old female    Chief Complaint: \" No complaints\"    Interval History:   Patient presents for follow-up breast cancer surveillance.  No concerns.  No breast issues.  She states concern for her daughters who are entering her 30s.  She states she is negative for the BRCA gene.  Sister also had breast cancer " with mastectomy.  Denies F/C/recent illness/infection, drenching night sweats, unintentional weight loss, anorexia/loss of appetite, HA, dizziness, lightheadedness, PICA, acute changes in vision/hearing, palpitations, CP, SOB, n/v/d/c/abd pain, bleeding, melena, urinary issues, haematuria, LAD, peripheral neuropathy, fatigue, loss of energy, bone pain, abnormal bruising or bleeding any location, sores, itchy or rashes.      PAST/CURRENT HISTORY     MEDICAL/SURGICAL HISTORY  Medical History[1]   Surgical History[2]     SOCIAL HISTORY  -Lives alone with cat, Tiggy  -Work place: [employer]   -Tobacco/smokeless use: Former   -Alcohol: Occasional   -Illicit drug or marijuana use: Denies   -Anabaptist or Spiritual beliefs: Denies   -Social Determinates of Health Concerns: Denies     FAMILY HISTORY  Family History[3]   -Dad prostate and melanoma   -Sister breast cancer   -No other known history of hematologic, bleeding, clotting, autoimmune, genetic, or malignant disorders in the family.     OCCUPATIONAL/ENVIRONMENTAL HISTORY/EXPOSURES:  -None reported    Active Problems, Allergy List, Medication List, and PMH/PSH/FH/Social Hx have been reviewed and reconciled in chart. Updates made when necessary.     REVIEW OF SYSTEMS   A review of systems has been completed and are negative for complaints except what is stated in the assessment, HPI, IH, ROS, and/or past medical history.    ALLERGIES AND MEDICATIONS     Allergies and Intolerances:   RX Allergies[4]   Medication Profile:   Current Outpatient Medications   Medication Instructions    alendronate (FOSAMAX) 70 mg, oral, Every 7 days    anastrozole (ARIMIDEX) 1 mg, oral, Daily    cetirizine (ZYRTEC) 10 mg, oral, Daily      Available Vaccination Record:   Immunization History   Administered Date(s) Administered    Flu vaccine (IIV4), preservative free *Check age/dose* 11/15/2020    Moderna SARS-CoV-2 Vaccination 04/01/2021, 04/29/2021, 12/13/2021    Pneumococcal conjugate  "vaccine, 20-valent (PREVNAR 20) 03/03/2025    Tdap vaccine, age 7 year and older (BOOSTRIX, ADACEL) 06/12/2025    Zoster vaccine, recombinant, adult (SHINGRIX) 03/03/2025, 06/12/2025      PHYSICAL EXAM     Vital Signs/Measurements:   Wt Readings from Last 5 Encounters:   07/08/25 66.2 kg (145 lb 15.1 oz)   05/23/25 65.3 kg (144 lb)   03/03/25 67.1 kg (148 lb)   02/05/25 67 kg (147 lb 13.1 oz)   08/08/24 64.4 kg (142 lb)         3/22/2024     2:46 PM 6/10/2024     2:39 PM 8/8/2024     1:45 PM 2/5/2025     2:21 PM 3/3/2025     2:25 PM 5/23/2025     1:55 PM 7/8/2025     1:47 PM   Vitals   Systolic 116 94 100 100 118 120 104   Diastolic 74 52 70 58 74 76 56   BP Location  Left arm  Left arm      Heart Rate 62 64  59   55   Temp  36.3 °C (97.3 °F)  36.9 °C (98.4 °F)   36.3 °C (97.3 °F)   Resp  16  16   16   Height 1.626 m (5' 4\")  1.6 m (5' 3\") 1.605 m (5' 3.19\")  1.626 m (5' 4\")     Weight (lb) 143 148.37 142 147.82 148 144 145.94   BMI 24.55 kg/m2 25.47 kg/m2 25.15 kg/m2 26.03 kg/m2 25.4 kg/m2 24.72 kg/m2 25.05 kg/m2   BSA (m2) 1.71 m2 1.74 m2 1.69 m2 1.73 m2 1.74 m2 1.72 m2 1.73 m2   Visit Report Report Report Report Report Report Report Report       Significant value        Performance:   ECOG Performance Status: 0     Grade ECOG performance status   0 Fully active, able to carry on all pre-disease performance without restriction   1 Restricted in physically strenuous activity but ambulatory and able to carry out work of a light or sedentary nature, e.g., light housework, office work   2 Ambulatory and capable of all selfcare but unable to carry out any work activities; Up and about more than 50% of waking hours   3 Capable of only limited selfcare, confined to bed or chair more than 50% of waking hours   4 Completely disabled; Cannot carry out any selfcare; Totally confined to bed or chair         Physical Exam:  General: Patient is awake/alert/oriented x3, no distress, nourished, hydrated, and cooperative, " ambulating without difficulty  Skin: Expected color for ethnicity, good turgor, dry, no prominent lesions, rashes, unusual bruising, or bleeding   Hair: Normal texture and distribution   Nails: Normal color, no deformities    HEENT:   Head: Normocephalic, atraumatic, no visible masses  Eyes: Conjunctiva clear, sclera non-icteric, no exudates or hemorrhages   Ears: nl appearance, hearing intact    Nose: no external lesions, mucosa non-inflamed, no rhinorrhea   Mouth: Mucous membranes moist, no lesions, sores, bleeding, or erythema     Head/Neck: Neck supple, no apparent injury, thyroid without mass or tenderness, No JVD, trachea midline, no bruits appreciated    Respiratory/Thorax: Patent airways, CTAB, chest symmetry, normal inspiratory and expiratory effort    Cardiovascular: Regular rate and rhythm, no murmur or gallop, no carotid bruit or thrills    Gastrointestinal: Bowel sounds normal, non-distended, soft, no tenderness, no masses or hernia, or organomegaly appreciated    Genitourinary: Deferred   Musculoskeletal: Normal gait, normal range of motion, no pain on palpation of spine, no deformity, normal strength, no atrophy, and no CVA tenderness appreciated   Extremities: No amputations or deformities, cyanosis, edema or viscosities, peripheral pulses intact   Neurological: Sensation present to touch, intact senses, motor response and reflexes normal, normal strength   Breast: Left no nipple abnormality or drainage, dominant masses, tenderness to palpation, axillary, or supraclavicular adenopathy.  Right no nipple abnormality other than tissue, no dominant masses tenderness.  Density noted.  No subclavicular or axillary adenopathy, no tenderness.   Lymphatic: No significant lymphadenopathy   Psychological: Intact recent and remote memory, judgement, and insight. Appropriate mood, affect, and behavior          RESULTS/DATA     Labs:   Labs:  Lab Results   Component Value Date    WBC 6.3 07/01/2025    NEUTROABS  "3.79 07/01/2025    IGABSOL 0.00 07/01/2025    LYMPHSABS 1.79 07/01/2025    MONOSABS 0.56 07/01/2025    EOSABS 0.06 07/01/2025    BASOSABS 0.05 07/01/2025    RBC 4.46 07/01/2025    MCV 89 07/01/2025    MCHC 33.1 07/01/2025    HGB 13.1 07/01/2025    HCT 39.6 07/01/2025     07/01/2025     No results found for: \"RETICCTPCT\"   Lab Results   Component Value Date    CREATININE 0.64 07/01/2025    BUN 9 07/01/2025    EGFR >90 07/01/2025     07/01/2025    K 4.0 07/01/2025     07/01/2025    CO2 26 07/01/2025      Lab Results   Component Value Date    ALT 26 07/01/2025    AST 27 07/01/2025    ALKPHOS 43 07/01/2025    BILITOT 0.6 07/01/2025      Lab Results   Component Value Date    TSH 1.68 03/05/2025     Lab Results   Component Value Date    TSH 1.68 03/05/2025        Radiology/Studies:   Please see above    ASSESSMENT/PLAN     Assessment and Plan:   #1. Right breast invasive ductal carcinoma, 2.5 cm, ER/CO positive, HER-2 positive, as well as DCIS  60 year-old  female presented Dec 2015 with right breast invasive ductal carcinoma, 2.5 cm, ER/CO positive, HER-2 positive, as well as DCIS,   neoadjuvant chemotherapy with TCHP from 12/21/15 to 4/2016,   lumpectomy on 5/4/16 of 0.7cm with 1/7LN  (ypT1b ypN1a),   completed adjuvant XRT on 7/27/16.   started on tamoxifen since 9/1/16. She was switched to anastrozole since 1/1/19.  She was also continued on every 3 weeks Herceptin for total a year after surgery. She completed in 12/2016. LMP 1/2016.  DEXA scan in 12/2020 showed osteoporosis and she was started on Fosamax.  Repeat DEXA 12/23/22 still showed osteoporosis, but was improved.  Repeat January 2025 with mild improvement.  Repeat 2 years.  Mammogram January 2025 negative for concerns repeat 1 year.  Will follow up with another provider in 6 months.  Patient is tolerating anastrozole.  Will complete anastrozole September 2026.  She will call if she needs refills.  Discussed NCCN guidelines with " patient and [employer] testing.  Provided pamphlet.  Discussed genetic and referral referral to address patient's concerns.    #2.  Osteoporosis  Patient is on Fosamax.  Tolerating well.      **Please follow with specialties as scheduled for other health needs and routine screenings.**  Problem List Items Addressed This Visit           ICD-10-CM    Malignant neoplasm of upper-inner quadrant of right female breast - Primary C50.211    Relevant Orders    CBC and Auto Differential    Comprehensive Metabolic Panel    Lactate dehydrogenase    BI mammo bilateral diagnostic tomosynthesis    Referral to Genetics    Clinic Appointment Request    Oncology Line Draw Appointment Request    CBC and Auto Differential    Comprehensive metabolic panel    Lactate dehydrogenase    Osteoporosis M81.0    Relevant Orders    CBC and Auto Differential    Comprehensive Metabolic Panel    Lactate dehydrogenase    Referral to Genetics    Clinic Appointment Request    Oncology Line Draw Appointment Request    CBC and Auto Differential    Comprehensive metabolic panel    Lactate dehydrogenase      Follow up:    RTC:  - 6 months with labs    Medications:  - Anastrozole 1 mg daily    Imaging/Testing:  - Mammogram January 2026    Referral(s):  - Genetics    Other Pertinent Appointments:  - N/A      Patient Discussion Summary:  Discussed plan of care in detail. Patient states understanding and in agreement to the plan. Answered all questions to there liking. The patient will call with any additional questions and/or concerns.    Thank you for allowing me to participate in your care. It was a pleasure meeting you.    Sincerely,  Tammi Camacho, APRN-CNP     Hematology/Oncology Clinical Nurse Practitioner     [facility]   [id number]  [place], [place] [address]  Office #: [phone]    DISCLAIMER:   In preparing for this visit and writing this note, I reviewed all the previous electronic medical  records (testing, labs, imaging, and other procedures, provider notes, and medical charts) of the patient available in the physician portal pertinent to patient care. Significant findings which helped in decision making are recorded in this chart.    This document may have been written by voice recognition software.  There may be some incorrect wording, spelling and/or spelling errors or punctuation errors that were not corrected prior to committing the note to the medical record. Please request clarification if there is documentation error or clarification is needed.   Time based billing: Please see documentation within this specific encounter.         [1]   Past Medical History:  Diagnosis Date    Allergic     Anxiety     Asymmetrical sensorineural hearing loss 09/21/2016    Breast cancer November 2015    Hx antineoplastic chemo December 2015    Personal history of diseases of the blood and blood-forming organs and certain disorders involving the immune mechanism     History of anemia    Personal history of irradiation August 2016    Personal history of malignant neoplasm of breast     History of malignant neoplasm of breast    Personal history of other diseases of the nervous system and sense organs     History of hearing loss   [2]   Past Surgical History:  Procedure Laterality Date    BREAST BIOPSY  December 2015    BREAST LUMPECTOMY  10/17/2016    Right Breast Lumpectomy    BREAST LUMPECTOMY Right 05/04/2016    Breast Surgery Lumpectomy    LYMPH NODE BIOPSY  May 2016    TONSILLECTOMY     [3]   Family History  Problem Relation Name Age of Onset    Cancer Father Dad     Blood clot Father Dad     Hypertension Father Dad     Breast cancer Sister      Arthritis Mother Mom     Hearing loss Mother Mom     Breast cancer Sister Sister     Depression Sister Sister     Breast cancer Sister Sister     Depression Sister Sister    [4]   Allergies  Allergen Reactions    Adhesive Rash     EKG PATCHES cause itchy rash     Loperamide Rash

## 2025-08-06 ENCOUNTER — APPOINTMENT (OUTPATIENT)
Dept: HEMATOLOGY/ONCOLOGY | Facility: CLINIC | Age: 60
End: 2025-08-06
Payer: COMMERCIAL

## 2025-08-30 ENCOUNTER — OFFICE VISIT (OUTPATIENT)
Dept: URGENT CARE | Age: 60
End: 2025-08-30
Payer: COMMERCIAL

## 2025-08-30 VITALS
DIASTOLIC BLOOD PRESSURE: 84 MMHG | WEIGHT: 144 LBS | SYSTOLIC BLOOD PRESSURE: 119 MMHG | RESPIRATION RATE: 16 BRPM | TEMPERATURE: 98.4 F | OXYGEN SATURATION: 98 % | BODY MASS INDEX: 24.72 KG/M2 | HEART RATE: 74 BPM

## 2025-08-30 DIAGNOSIS — R39.15 URINARY URGENCY: Primary | ICD-10-CM

## 2025-08-30 DIAGNOSIS — R30.0 DYSURIA: ICD-10-CM

## 2025-08-30 LAB
POC APPEARANCE, URINE: ABNORMAL
POC BILIRUBIN, URINE: NEGATIVE
POC BLOOD, URINE: ABNORMAL
POC COLOR, URINE: ABNORMAL
POC GLUCOSE, URINE: NEGATIVE MG/DL
POC KETONES, URINE: NEGATIVE MG/DL
POC LEUKOCYTES, URINE: ABNORMAL
POC NITRITE,URINE: NEGATIVE
POC PH, URINE: 6 PH
POC PROTEIN, URINE: ABNORMAL MG/DL
POC SPECIFIC GRAVITY, URINE: <=1.005
POC UROBILINOGEN, URINE: 0.2 EU/DL

## 2025-08-30 RX ORDER — NITROFURANTOIN 25; 75 MG/1; MG/1
100 CAPSULE ORAL 2 TIMES DAILY
Qty: 14 CAPSULE | Refills: 0 | Status: SHIPPED | OUTPATIENT
Start: 2025-08-30 | End: 2025-08-30

## 2025-08-30 RX ORDER — NITROFURANTOIN 25; 75 MG/1; MG/1
100 CAPSULE ORAL 2 TIMES DAILY
Qty: 14 CAPSULE | Refills: 0 | Status: SHIPPED | OUTPATIENT
Start: 2025-08-30 | End: 2025-09-06

## 2025-08-30 ASSESSMENT — ENCOUNTER SYMPTOMS
FREQUENCY: 1
DYSURIA: 1

## 2025-09-01 LAB — BACTERIA UR CULT: NORMAL

## 2025-09-05 ENCOUNTER — TELEPHONE (OUTPATIENT)
Dept: URGENT CARE | Age: 60
End: 2025-09-05